# Patient Record
Sex: FEMALE | Race: WHITE | NOT HISPANIC OR LATINO | URBAN - METROPOLITAN AREA
[De-identification: names, ages, dates, MRNs, and addresses within clinical notes are randomized per-mention and may not be internally consistent; named-entity substitution may affect disease eponyms.]

---

## 2017-08-28 ENCOUNTER — APPOINTMENT (OUTPATIENT)
Dept: URBAN - METROPOLITAN AREA CLINIC 200 | Age: 52
Setting detail: DERMATOLOGY
End: 2017-08-29

## 2017-08-28 DIAGNOSIS — L57.8 OTHER SKIN CHANGES DUE TO CHRONIC EXPOSURE TO NONIONIZING RADIATION: ICD-10-CM

## 2017-08-28 DIAGNOSIS — D22 MELANOCYTIC NEVI: ICD-10-CM

## 2017-08-28 PROBLEM — D22.39 MELANOCYTIC NEVI OF OTHER PARTS OF FACE: Status: ACTIVE | Noted: 2017-08-28

## 2017-08-28 PROCEDURE — OTHER COUNSELING: OTHER

## 2017-08-28 PROCEDURE — 99213 OFFICE O/P EST LOW 20 MIN: CPT

## 2017-08-28 PROCEDURE — OTHER DEFER: OTHER

## 2017-08-28 ASSESSMENT — LOCATION DETAILED DESCRIPTION DERM
LOCATION DETAILED: RIGHT SUPERIOR UPPER BACK
LOCATION DETAILED: LEFT SUPERIOR LATERAL BUCCAL CHEEK

## 2017-08-28 ASSESSMENT — LOCATION SIMPLE DESCRIPTION DERM
LOCATION SIMPLE: RIGHT UPPER BACK
LOCATION SIMPLE: LEFT CHEEK

## 2017-08-28 ASSESSMENT — LOCATION ZONE DERM
LOCATION ZONE: TRUNK
LOCATION ZONE: FACE

## 2017-09-07 ENCOUNTER — APPOINTMENT (OUTPATIENT)
Dept: URBAN - METROPOLITAN AREA CLINIC 200 | Age: 52
Setting detail: DERMATOLOGY
End: 2017-09-08

## 2017-09-07 DIAGNOSIS — D485 NEOPLASM OF UNCERTAIN BEHAVIOR OF SKIN: ICD-10-CM

## 2017-09-07 PROBLEM — D48.5 NEOPLASM OF UNCERTAIN BEHAVIOR OF SKIN: Status: ACTIVE | Noted: 2017-09-07

## 2017-09-07 PROCEDURE — OTHER BIOPSY BY SHAVE METHOD: OTHER

## 2017-09-07 PROCEDURE — 11100: CPT

## 2017-09-07 ASSESSMENT — LOCATION SIMPLE DESCRIPTION DERM: LOCATION SIMPLE: LEFT CHEEK

## 2017-09-07 ASSESSMENT — LOCATION ZONE DERM: LOCATION ZONE: FACE

## 2017-09-07 ASSESSMENT — LOCATION DETAILED DESCRIPTION DERM: LOCATION DETAILED: LEFT SUPERIOR LATERAL BUCCAL CHEEK

## 2017-09-07 NOTE — PROCEDURE: BIOPSY BY SHAVE METHOD
Wound Care: Vaseline
Biopsy Method: Personna blade
Notification Instructions: Patient will be notified of biopsy results. However, patient instructed to call the office if not contacted within 2 weeks.
Bill 28057 For Specimen Handling/Conveyance To Laboratory?: no
X Size Of Lesion In Cm: 0
Type Of Destruction Used: Curettage
Dressing: bandage
Biopsy Type: H and E
Detail Level: Detailed
Size Of Lesion In Cm: 0.3
Billing Type: Third-Party Bill
Post-Care Instructions: I reviewed with the patient in detail post-care instructions. Patient is to keep the biopsy site dry overnight, and then apply bacitracin twice daily until healed. Patient may apply hydrogen peroxide soaks to remove any crusting.
Hemostasis: Drysol
consent was obtained and risks were reviewed including but not limited to scarring, infection, bleeding, scabbing, incomplete removal, nerve damage and allergy to anesthesia.
Anesthesia Volume In Cc (Will Not Render If 0): 0.1
Anesthesia Type: 1% lidocaine with epinephrine

## 2018-08-28 ENCOUNTER — APPOINTMENT (OUTPATIENT)
Dept: URBAN - METROPOLITAN AREA CLINIC 200 | Age: 53
Setting detail: DERMATOLOGY
End: 2018-09-10

## 2018-08-28 DIAGNOSIS — Z85.828 PERSONAL HISTORY OF OTHER MALIGNANT NEOPLASM OF SKIN: ICD-10-CM

## 2018-08-28 PROCEDURE — OTHER COUNSELING: OTHER

## 2018-08-28 PROCEDURE — 99213 OFFICE O/P EST LOW 20 MIN: CPT

## 2018-08-28 ASSESSMENT — LOCATION ZONE DERM: LOCATION ZONE: TRUNK

## 2018-08-28 ASSESSMENT — LOCATION DETAILED DESCRIPTION DERM: LOCATION DETAILED: PERIUMBILICAL SKIN

## 2018-08-28 ASSESSMENT — LOCATION SIMPLE DESCRIPTION DERM: LOCATION SIMPLE: ABDOMEN

## 2019-02-27 ENCOUNTER — APPOINTMENT (OUTPATIENT)
Dept: URBAN - METROPOLITAN AREA CLINIC 200 | Age: 54
Setting detail: DERMATOLOGY
End: 2019-02-28

## 2019-02-27 DIAGNOSIS — L57.8 OTHER SKIN CHANGES DUE TO CHRONIC EXPOSURE TO NONIONIZING RADIATION: ICD-10-CM

## 2019-02-27 PROCEDURE — OTHER MIPS QUALITY: OTHER

## 2019-02-27 PROCEDURE — OTHER COUNSELING: OTHER

## 2019-02-27 PROCEDURE — 99213 OFFICE O/P EST LOW 20 MIN: CPT

## 2019-02-27 ASSESSMENT — LOCATION DETAILED DESCRIPTION DERM: LOCATION DETAILED: LEFT MEDIAL SUPERIOR CHEST

## 2019-02-27 ASSESSMENT — LOCATION SIMPLE DESCRIPTION DERM: LOCATION SIMPLE: CHEST

## 2019-02-27 ASSESSMENT — LOCATION ZONE DERM: LOCATION ZONE: TRUNK

## 2019-09-06 ENCOUNTER — APPOINTMENT (OUTPATIENT)
Dept: URBAN - METROPOLITAN AREA CLINIC 200 | Age: 54
Setting detail: DERMATOLOGY
End: 2019-09-11

## 2019-09-06 DIAGNOSIS — L57.8 OTHER SKIN CHANGES DUE TO CHRONIC EXPOSURE TO NONIONIZING RADIATION: ICD-10-CM

## 2019-09-06 PROBLEM — D23.5 OTHER BENIGN NEOPLASM OF SKIN OF TRUNK: Status: ACTIVE | Noted: 2019-09-06

## 2019-09-06 PROCEDURE — OTHER PRESCRIPTION SAMPLES PROVIDED: OTHER

## 2019-09-06 PROCEDURE — OTHER COUNSELING: OTHER

## 2019-09-06 PROCEDURE — OTHER MIPS QUALITY: OTHER

## 2019-09-06 PROCEDURE — OTHER RECOMMENDATIONS: OTHER

## 2019-09-06 PROCEDURE — 99213 OFFICE O/P EST LOW 20 MIN: CPT

## 2019-09-06 ASSESSMENT — LOCATION ZONE DERM: LOCATION ZONE: TRUNK

## 2019-09-06 ASSESSMENT — LOCATION SIMPLE DESCRIPTION DERM: LOCATION SIMPLE: CHEST

## 2019-09-06 ASSESSMENT — LOCATION DETAILED DESCRIPTION DERM: LOCATION DETAILED: LEFT MEDIAL SUPERIOR CHEST

## 2019-09-06 NOTE — PROCEDURE: MIPS QUALITY
Additional Notes: Shingles SHINGRIX vaccine not received due to it being on back order.
Detail Level: Detailed
Quality 474: Zoster Vaccination Status: Shingrix vaccine was not administered for reasons documented by clinician (e.g. patient administered vaccine other than Shingrix, patient allergy or other medical reasons, patient declined or other patient reasons, vaccine not available or other system reasons)

## 2020-03-06 ENCOUNTER — APPOINTMENT (OUTPATIENT)
Dept: URBAN - METROPOLITAN AREA CLINIC 200 | Age: 55
Setting detail: DERMATOLOGY
End: 2020-03-11

## 2020-03-06 DIAGNOSIS — L20.84 INTRINSIC (ALLERGIC) ECZEMA: ICD-10-CM

## 2020-03-06 DIAGNOSIS — L82.1 OTHER SEBORRHEIC KERATOSIS: ICD-10-CM

## 2020-03-06 DIAGNOSIS — L57.8 OTHER SKIN CHANGES DUE TO CHRONIC EXPOSURE TO NONIONIZING RADIATION: ICD-10-CM

## 2020-03-06 PROCEDURE — OTHER REASSURANCE: OTHER

## 2020-03-06 PROCEDURE — 99213 OFFICE O/P EST LOW 20 MIN: CPT

## 2020-03-06 PROCEDURE — OTHER COUNSELING: OTHER

## 2020-03-06 PROCEDURE — OTHER MIPS QUALITY: OTHER

## 2020-03-06 PROCEDURE — OTHER PRESCRIPTION: OTHER

## 2020-03-06 PROCEDURE — OTHER PRESCRIPTION MEDICATION MANAGEMENT: OTHER

## 2020-03-06 RX ORDER — TRIAMCINOLONE ACETONIDE 1 MG/G
CREAM TOPICAL
Qty: 1 | Refills: 5 | Status: ERX | COMMUNITY
Start: 2020-03-06

## 2020-03-06 ASSESSMENT — LOCATION SIMPLE DESCRIPTION DERM
LOCATION SIMPLE: CHEST
LOCATION SIMPLE: LEFT HAND
LOCATION SIMPLE: ABDOMEN
LOCATION SIMPLE: LEFT CHEEK
LOCATION SIMPLE: RIGHT HAND

## 2020-03-06 ASSESSMENT — LOCATION ZONE DERM
LOCATION ZONE: TRUNK
LOCATION ZONE: HAND
LOCATION ZONE: FACE

## 2020-03-06 ASSESSMENT — LOCATION DETAILED DESCRIPTION DERM
LOCATION DETAILED: RIGHT DORSAL MIDDLE METACARPOPHALANGEAL JOINT
LOCATION DETAILED: LEFT LATERAL ABDOMEN
LOCATION DETAILED: LEFT DORSAL MIDDLE METACARPOPHALANGEAL JOINT
LOCATION DETAILED: LEFT MEDIAL SUPERIOR CHEST
LOCATION DETAILED: LEFT INFERIOR LATERAL BUCCAL CHEEK

## 2020-03-06 NOTE — PROCEDURE: PRESCRIPTION MEDICATION MANAGEMENT
Render In Strict Bullet Format?: No
Initiate Treatment: triamcinolone acetonide 0.1 % topical cream
Detail Level: Detailed
Samples Given: Eucerin eczema relief , lipikar

## 2020-09-11 ENCOUNTER — APPOINTMENT (OUTPATIENT)
Dept: URBAN - METROPOLITAN AREA CLINIC 200 | Age: 55
Setting detail: DERMATOLOGY
End: 2020-09-18

## 2020-09-11 DIAGNOSIS — L23.7 ALLERGIC CONTACT DERMATITIS DUE TO PLANTS, EXCEPT FOOD: ICD-10-CM

## 2020-09-11 DIAGNOSIS — Z85.828 PERSONAL HISTORY OF OTHER MALIGNANT NEOPLASM OF SKIN: ICD-10-CM

## 2020-09-11 DIAGNOSIS — L70.0 ACNE VULGARIS: ICD-10-CM

## 2020-09-11 DIAGNOSIS — L71.8 OTHER ROSACEA: ICD-10-CM

## 2020-09-11 DIAGNOSIS — L57.8 OTHER SKIN CHANGES DUE TO CHRONIC EXPOSURE TO NONIONIZING RADIATION: ICD-10-CM

## 2020-09-11 PROBLEM — D23.5 OTHER BENIGN NEOPLASM OF SKIN OF TRUNK: Status: ACTIVE | Noted: 2020-09-11

## 2020-09-11 PROCEDURE — OTHER PRESCRIPTION: OTHER

## 2020-09-11 PROCEDURE — OTHER PRESCRIPTION MEDICATION MANAGEMENT: OTHER

## 2020-09-11 PROCEDURE — 99213 OFFICE O/P EST LOW 20 MIN: CPT

## 2020-09-11 PROCEDURE — OTHER COUNSELING: OTHER

## 2020-09-11 RX ORDER — METRONIDAZOLE 7.5 MG/G
GEL TOPICAL
Qty: 1 | Refills: 6 | Status: ERX | COMMUNITY
Start: 2020-09-11

## 2020-09-11 ASSESSMENT — LOCATION DETAILED DESCRIPTION DERM
LOCATION DETAILED: LEFT MEDIAL SUPERIOR CHEST
LOCATION DETAILED: PERIUMBILICAL SKIN
LOCATION DETAILED: LEFT LATERAL ABDOMEN
LOCATION DETAILED: RIGHT ANTERIOR PROXIMAL THIGH

## 2020-09-11 ASSESSMENT — LOCATION ZONE DERM
LOCATION ZONE: LEG
LOCATION ZONE: TRUNK

## 2020-09-11 ASSESSMENT — LOCATION SIMPLE DESCRIPTION DERM
LOCATION SIMPLE: RIGHT THIGH
LOCATION SIMPLE: ABDOMEN
LOCATION SIMPLE: CHEST

## 2021-03-24 ENCOUNTER — APPOINTMENT (OUTPATIENT)
Dept: URBAN - METROPOLITAN AREA CLINIC 200 | Age: 56
Setting detail: DERMATOLOGY
End: 2021-03-30

## 2021-03-24 DIAGNOSIS — B07.8 OTHER VIRAL WARTS: ICD-10-CM

## 2021-03-24 DIAGNOSIS — Z85.828 PERSONAL HISTORY OF OTHER MALIGNANT NEOPLASM OF SKIN: ICD-10-CM

## 2021-03-24 DIAGNOSIS — L71.8 OTHER ROSACEA: ICD-10-CM

## 2021-03-24 DIAGNOSIS — L57.8 OTHER SKIN CHANGES DUE TO CHRONIC EXPOSURE TO NONIONIZING RADIATION: ICD-10-CM

## 2021-03-24 PROBLEM — D48.5 NEOPLASM OF UNCERTAIN BEHAVIOR OF SKIN: Status: ACTIVE | Noted: 2021-03-24

## 2021-03-24 PROCEDURE — OTHER BENIGN DESTRUCTION: OTHER

## 2021-03-24 PROCEDURE — 99213 OFFICE O/P EST LOW 20 MIN: CPT | Mod: 25

## 2021-03-24 PROCEDURE — OTHER COUNSELING: OTHER

## 2021-03-24 PROCEDURE — OTHER MIPS QUALITY: OTHER

## 2021-03-24 PROCEDURE — 17110 DESTRUCT B9 LESION 1-14: CPT

## 2021-03-24 ASSESSMENT — LOCATION SIMPLE DESCRIPTION DERM
LOCATION SIMPLE: LEFT CHEEK
LOCATION SIMPLE: RIGHT CHEEK
LOCATION SIMPLE: ABDOMEN
LOCATION SIMPLE: CHEST

## 2021-03-24 ASSESSMENT — LOCATION DETAILED DESCRIPTION DERM
LOCATION DETAILED: RIGHT SUPERIOR CENTRAL MALAR CHEEK
LOCATION DETAILED: LEFT MEDIAL SUPERIOR CHEST
LOCATION DETAILED: LEFT INFERIOR MEDIAL MALAR CHEEK
LOCATION DETAILED: PERIUMBILICAL SKIN

## 2021-03-24 ASSESSMENT — SEVERITY ASSESSMENT OVERALL AMONG ALL PATIENTS
IN YOUR EXPERIENCE, AMONG ALL PATIENTS YOU HAVE SEEN WITH THIS CONDITION, HOW SEVERE IS THIS PATIENT'S CONDITION?: MILD TO MODERATE

## 2021-03-24 ASSESSMENT — TOTAL NUMBER OF VERRUCA VULGARIS: # OF LESIONS?: 1

## 2021-03-24 ASSESSMENT — LOCATION ZONE DERM
LOCATION ZONE: TRUNK
LOCATION ZONE: FACE

## 2021-03-24 NOTE — PROCEDURE: BENIGN DESTRUCTION
Render Note In Bullet Format When Appropriate: No
Medical Necessity Information: It is in your best interest to select a reason for this procedure from the list below. All of these items fulfill various CMS LCD requirements except the new and changing color options.
Anesthesia Type: 1% Xylocaine with [id number] epinephrine and sodium bicarbonate
Post-Care Instructions: I reviewed with the patient in detail post-care instructions. Patient is to wear sunprotection, and avoid picking at any of the treated lesions. Pt may apply Vaseline to crusted or scabbing areas.
Treatment Number (Will Not Render If 0): 1
Detail Level: Detailed
Medical Necessity Clause: This procedure was medically necessary because the lesions that were treated were: irritated
Anesthesia Volume In Cc: 0.5
Consent: The patient's consent was obtained including but not limited to risks of crusting, scabbing, blistering, scarring, darker or lighter pigmentary change, recurrence, incomplete removal and infection.

## 2021-10-14 ENCOUNTER — APPOINTMENT (OUTPATIENT)
Dept: URBAN - METROPOLITAN AREA CLINIC 200 | Age: 56
Setting detail: DERMATOLOGY
End: 2021-10-15

## 2021-10-14 DIAGNOSIS — L82.1 OTHER SEBORRHEIC KERATOSIS: ICD-10-CM

## 2021-10-14 DIAGNOSIS — L57.8 OTHER SKIN CHANGES DUE TO CHRONIC EXPOSURE TO NONIONIZING RADIATION: ICD-10-CM

## 2021-10-14 DIAGNOSIS — Z11.52 ENCOUNTER FOR SCREENING FOR COVID-19: ICD-10-CM

## 2021-10-14 PROBLEM — D23.5 OTHER BENIGN NEOPLASM OF SKIN OF TRUNK: Status: ACTIVE | Noted: 2021-10-14

## 2021-10-14 PROCEDURE — OTHER REASSURANCE: OTHER

## 2021-10-14 PROCEDURE — OTHER MIPS QUALITY: OTHER

## 2021-10-14 PROCEDURE — OTHER SCREENING FOR COVID-19: OTHER

## 2021-10-14 PROCEDURE — 99213 OFFICE O/P EST LOW 20 MIN: CPT

## 2021-10-14 PROCEDURE — OTHER SUNSCREEN RECOMMENDATIONS: OTHER

## 2021-10-14 PROCEDURE — OTHER COUNSELING: OTHER

## 2021-10-14 ASSESSMENT — LOCATION SIMPLE DESCRIPTION DERM
LOCATION SIMPLE: ABDOMEN
LOCATION SIMPLE: LEFT SUBMANDIBULAR AREA

## 2021-10-14 ASSESSMENT — LOCATION DETAILED DESCRIPTION DERM
LOCATION DETAILED: PERIUMBILICAL SKIN
LOCATION DETAILED: LEFT SUBMANDIBULAR AREA

## 2021-10-14 ASSESSMENT — LOCATION ZONE DERM
LOCATION ZONE: TRUNK
LOCATION ZONE: FACE

## 2021-10-14 ASSESSMENT — PAIN INTENSITY VAS: HOW INTENSE IS YOUR PAIN 0 BEING NO PAIN, 10 BEING THE MOST SEVERE PAIN POSSIBLE?: NO PAIN

## 2022-03-10 ENCOUNTER — OFFICE VISIT (OUTPATIENT)
Dept: ENDOCRINOLOGY | Facility: CLINIC | Age: 57
End: 2022-03-10
Payer: COMMERCIAL

## 2022-03-10 VITALS
WEIGHT: 133 LBS | HEIGHT: 62 IN | OXYGEN SATURATION: 99 % | SYSTOLIC BLOOD PRESSURE: 132 MMHG | DIASTOLIC BLOOD PRESSURE: 88 MMHG | BODY MASS INDEX: 24.48 KG/M2 | HEART RATE: 104 BPM

## 2022-03-10 DIAGNOSIS — M81.0 OSTEOPOROSIS WITHOUT CURRENT PATHOLOGICAL FRACTURE, UNSPECIFIED OSTEOPOROSIS TYPE: Primary | ICD-10-CM

## 2022-03-10 PROCEDURE — 3008F BODY MASS INDEX DOCD: CPT | Performed by: INTERNAL MEDICINE

## 2022-03-10 PROCEDURE — 99205 OFFICE O/P NEW HI 60 MIN: CPT | Performed by: INTERNAL MEDICINE

## 2022-03-10 RX ORDER — PNV NO.95/FERROUS FUM/FOLIC AC 28MG-0.8MG
1 TABLET ORAL DAILY
COMMUNITY

## 2022-03-10 RX ORDER — ROSUVASTATIN CALCIUM 10 MG/1
TABLET, FILM COATED ORAL
COMMUNITY
Start: 2022-01-04

## 2022-03-10 RX ORDER — CETIRIZINE HYDROCHLORIDE 10 MG/1
10 TABLET ORAL DAILY
COMMUNITY

## 2022-03-10 RX ORDER — OLMESARTAN MEDOXOMIL 20 MG/1
20 TABLET ORAL DAILY
COMMUNITY

## 2022-03-10 ASSESSMENT — PAIN SCALES - GENERAL: PAINLEVEL: 0-NO PAIN

## 2022-03-10 NOTE — LETTER
March 10, 2022     Devin Borja MD  [address]  [facility]     Patient: Jacqueline Reid  YOB: 1965  Date of Visit: 3/10/2022      Dear Dr. Borja:    Thank you for referring Jacqueline Reid to me for evaluation. Below are my notes for this consultation.    If you have questions, please do not hesitate to call me. I look forward to following your patient along with you.         Sincerely,        Lorenza Yi MD        CC: No Recipients  Lorenza Yi MD  3/10/2022 10:32 PM  Signed         Endocrinology Consultation Report     Patient Name: Jacqueline Reid  MR#: [id number]  : 1965  Consultant: Lorenza Yi MD      Jacqueline Reid is a 56 y.o. female who presents for evaluation for osteoporosis    HPI:  She had left wrist fracture on 10/30/21, she fell sideways (was holding on to a tree, but had to let go) onto her left side. She had surgery on 2021.   Her ortho recommended a bone density. The radius is healed. 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  It is now healing well. She is doing PT twice a week.   Left wrist fracture as a child (age 7), left collar bone fracture (around age 5)  Left toe fracture.       OSTEOPOROSIS HISTORY:  Fractures: see above  Kidney stones: none  Chronic steroids: none  Seizure medications: none  Acid reflux: none  History of radiation: none  Dental problems: none  Cigarette smoking: never  Alcohol: once a week or less, 1 serving  Caffeine: one cup of coffee per day  Pregnancies: 2 (  and )  LMP age 51  No HRT  No parental history of hip fracture      Supplements:  - calcium plus D 600 mg, 2 pills daily, for many years  - vitamin D3 2000 IU daily, started 2022    Diet  Milk with cereal 1 to 2 times per week  Greek yogurt almost daily  Cheese most days    Exercise  She was going to the gym before COVID  She now walks for exercise  Swimming a couple of days a week      She worked as an academic  advisor at [employer]  Stopped working in   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      Medical History:  Past Medical History:   Diagnosis Date   • Hyperlipidemia    • Hypertension    • Osteoporosis        Surgical History:   Past Surgical History:   Procedure Laterality Date   •  SECTION     • WRIST SURGERY  2021       Family History:  Family History   Problem Relation Age of Onset   • Diabetes Biological Mother    • Lung cancer Biological Mother    • Hypertension Biological Mother    • Hyperlipidemia Biological Mother    • Kidney disease Biological Mother    • Prostate cancer Biological Father    • Hypertension Biological Father    • Hyperlipidemia Biological Father    • Stroke Biological Father    • Diabetes Biological Sister    • Hypertension Biological Sister    • Hyperlipidemia Biological Sister    • Hypertension Biological Brother    • Hyperlipidemia Biological Brother    • Heart disease Biological Brother    • Heart attack Biological Brother         Social history:  Social History     Socioeconomic History   • Marital status:      Spouse name: Not on file   • Number of children: Not on file   • Years of education: Not on file   • Highest education level: Not on file   Occupational History   • Not on file   Tobacco Use   • Smoking status: Never Smoker   • Smokeless tobacco: Never Used   Substance and Sexual Activity   • Alcohol use: Yes   • Drug use: Not on file   • Sexual activity: Defer   Other Topics Concern   • Not on file   Social History Narrative   • Not on file     Social Determinants of Health     Financial Resource Strain: Not on file   Food Insecurity: Not on file   Transportation Needs: Not on file   Physical Activity: Not on file   Stress: Not on file   Social Connections: Not on file   Intimate Partner Violence: Not on file   Housing Stability: Not on file       Medication(active prior to today):  Current Outpatient Medications   Medication Sig Dispense Refill   •  "calcium carbonate-vitamin D3 500 mg-10 mcg (400 unit) tablet Take 1 tablet by mouth daily.     • cetirizine (ZyrTEC) 10 mg tablet Take 10 mg by mouth daily.     • CRESTOR 10 mg tablet      • olmesartan (BENICAR) 20 mg tablet Take 20 mg by mouth daily.       No current facility-administered medications for this visit.       Allergies:  Banana, Kiwi, Quinolones, and Sulfa (sulfonamide antibiotics)    Review of Systems  All other systems reviewed and negative except as noted in HPI    Vitals:    03/10/22 1318   BP: 132/88   BP Location: Right upper arm   Patient Position: Sitting   Pulse: (!) 104   SpO2: 99%   Weight: 60.3 kg (133 lb)   Height: 1.575 m (5' 2\")     Body mass index is 24.33 kg/m².    Physical Exam    No results found for: HGBA1C, CREATININE, K  No results found for: TSH, T3FREE, FREET4, TRIG, LDL, CHOL, LDLDIRECT, ALT        LABS:  12/28/2021  Free T4 1.3  Cholesterol 174  HDL 64  TG 80  LDL 93  Albumin 4.5  Globulin 2.7  And alk phos 88  ALT 12  TSH 3.29      4/1/2021  TSH 5.13  BG 90  Creatinine 0.97  Sodium 141  Calcium 9.8  Albumin 4.8  Alk phos 74  ALT 12  Free T4 1.1  Hemoglobin 14.4      [facility] 12/29/2021 ([facility])  Bone densitometry of the PA lumbar spine and left hip was performed using a Hologic bone densitometer. The patient is well positioned and the regions of interest are properly placed.     PA spine L1-L4       BMD:  0.673 g/cm2     T-score:  -3.4,   osteoporosis       Total hip                   BMD:  0.626 g/cm2     T-score:  -2.6,   osteoporosis         Femoral neck          BMD:  0.529 g/cm2     T-score:  -2.9,   osteoporosis       Assessment/Plan   Osteoporosis  - s/p left wrist fracture with difficulty healing  - very low T-score  - consistent with severe osteoporosis  - start with work up for secondary causes of osteoporosis  - she is a candidate for anabolic bone therapy  - I discussed treatment with Tymlos with patient and her . No contraindications. I " discussed the side effects.   - she understands that we use Tymlos for 18 months and it has be followed by treatment with an antiresorptive agent  - I discussed her calcium and vitamin D supplement  - long discussion about exercise.       Problem List Items Addressed This Visit     None      Visit Diagnoses     Osteoporosis without current pathological fracture, unspecified osteoporosis type    -  Primary    Relevant Orders    Comprehensive metabolic panel    CBC    Vitamin D 25 hydroxy    PTH, intact    Phosphorus    Celiac reflex panel    Calcium, urine, 24 hour    Creatinine, urine, 24 hour        Patient Instructions   - take calcium plus D 600 mg, one pill daily with food    - vitamin D3, 2000 Iu daily    - blood test and 24 hr urine collection    - send me a portal message when you submit the urine  I will send a prescription for Tymlos after that  Plan for daily Tymlos injection for 18 months  After that you have to take another medication like fosamax to keep the bone gains you made       - regular exercise safely  [email or web address] for safe exercises                  I spent 60 minutes on this date of service performing the following activities: obtaining history, performing examination, entering orders, documenting, preparing for visit, obtaining / reviewing records, providing counseling and education, independently reviewing study/studies and coordinating care.      Lorenza Yi MD

## 2022-03-10 NOTE — PROGRESS NOTES
Endocrinology Consultation Report     Patient Name: Jacqueline Reid  MR#: [id number]  : 1965  Consultant: Lorenza Yi MD      Jacqueline Reid is a 56 y.o. female who presents for evaluation for osteoporosis    HPI:  She had left wrist fracture on 10/30/21, she fell sideways (was holding on to a tree, but had to let go) onto her left side. She had surgery on 2021.   Her ortho recommended a bone density. The radius is healed. 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  It is now healing well. She is doing PT twice a week.   Left wrist fracture as a child (age 7), left collar bone fracture (around age 5)  Left toe fracture.       OSTEOPOROSIS HISTORY:  Fractures: see above  Kidney stones: none  Chronic steroids: none  Seizure medications: none  Acid reflux: none  History of radiation: none  Dental problems: none  Cigarette smoking: never  Alcohol: once a week or less, 1 serving  Caffeine: one cup of coffee per day  Pregnancies: 2 (  and )  LMP age 51  No HRT  No parental history of hip fracture      Supplements:  - calcium plus D 600 mg, 2 pills daily, for many years  - vitamin D3 2000 IU daily, started 2022    Diet  Milk with cereal 1 to 2 times per week  Greek yogurt almost daily  Cheese most days    Exercise  She was going to the gym before COVID  She now walks for exercise  Swimming a couple of days a week      She worked as an  at [employer]  Stopped working in   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      Medical History:  Past Medical History:   Diagnosis Date   • Hyperlipidemia    • Hypertension    • Osteoporosis        Surgical History:   Past Surgical History:   Procedure Laterality Date   •  SECTION     • WRIST SURGERY  2021       Family History:  Family History   Problem Relation Age of Onset   • Diabetes Biological Mother    • Lung cancer Biological Mother    • Hypertension Biological Mother    • Hyperlipidemia  Biological Mother    • Kidney disease Biological Mother    • Prostate cancer Biological Father    • Hypertension Biological Father    • Hyperlipidemia Biological Father    • Stroke Biological Father    • Diabetes Biological Sister    • Hypertension Biological Sister    • Hyperlipidemia Biological Sister    • Hypertension Biological Brother    • Hyperlipidemia Biological Brother    • Heart disease Biological Brother    • Heart attack Biological Brother         Social history:  Social History     Socioeconomic History   • Marital status:      Spouse name: Not on file   • Number of children: Not on file   • Years of education: Not on file   • Highest education level: Not on file   Occupational History   • Not on file   Tobacco Use   • Smoking status: Never Smoker   • Smokeless tobacco: Never Used   Substance and Sexual Activity   • Alcohol use: Yes   • Drug use: Not on file   • Sexual activity: Defer   Other Topics Concern   • Not on file   Social History Narrative   • Not on file     Social Determinants of Health     Financial Resource Strain: Not on file   Food Insecurity: Not on file   Transportation Needs: Not on file   Physical Activity: Not on file   Stress: Not on file   Social Connections: Not on file   Intimate Partner Violence: Not on file   Housing Stability: Not on file       Medication(active prior to today):  Current Outpatient Medications   Medication Sig Dispense Refill   • calcium carbonate-vitamin D3 500 mg-10 mcg (400 unit) tablet Take 1 tablet by mouth daily.     • cetirizine (ZyrTEC) 10 mg tablet Take 10 mg by mouth daily.     • CRESTOR 10 mg tablet      • olmesartan (BENICAR) 20 mg tablet Take 20 mg by mouth daily.       No current facility-administered medications for this visit.       Allergies:  Banana, Kiwi, Quinolones, and Sulfa (sulfonamide antibiotics)    Review of Systems  All other systems reviewed and negative except as noted in HPI    Vitals:    03/10/22 1318   BP: 132/88   BP  "Location: Right upper arm   Patient Position: Sitting   Pulse: (!) 104   SpO2: 99%   Weight: 60.3 kg (133 lb)   Height: 1.575 m (5' 2\")     Body mass index is 24.33 kg/m².    Physical Exam    No results found for: HGBA1C, CREATININE, K  No results found for: TSH, T3FREE, FREET4, TRIG, LDL, CHOL, LDLDIRECT, ALT        LABS:  12/28/2021  Free T4 1.3  Cholesterol 174  HDL 64  TG 80  LDL 93  Albumin 4.5  Globulin 2.7  And alk phos 88  ALT 12  TSH 3.29      4/1/2021  TSH 5.13  BG 90  Creatinine 0.97  Sodium 141  Calcium 9.8  Albumin 4.8  Alk phos 74  ALT 12  Free T4 1.1  Hemoglobin 14.4      DEXA 12/29/2021 ([facility])  Bone densitometry of the PA lumbar spine and left hip was performed using a Hologic bone densitometer. The patient is well positioned and the regions of interest are properly placed.     PA spine L1-L4       BMD:  0.673 g/cm2     T-score:  -3.4,   osteoporosis       Total hip                   BMD:  0.626 g/cm2     T-score:  -2.6,   osteoporosis         Femoral neck          BMD:  0.529 g/cm2     T-score:  -2.9,   osteoporosis       Assessment/Plan   Osteoporosis  - s/p left wrist fracture with difficulty healing  - very low T-score  - consistent with severe osteoporosis  - start with work up for secondary causes of osteoporosis  - she is a candidate for anabolic bone therapy  - I discussed treatment with Tymlos with patient and her . No contraindications. I discussed the side effects.   - she understands that we use Tymlos for 18 months and it has be followed by treatment with an antiresorptive agent  - I discussed her calcium and vitamin D supplement  - long discussion about exercise.       Problem List Items Addressed This Visit     None      Visit Diagnoses     Osteoporosis without current pathological fracture, unspecified osteoporosis type    -  Primary    Relevant Orders    Comprehensive metabolic panel    CBC    Vitamin D 25 hydroxy    PTH, intact    Phosphorus    " Celiac reflex panel    Calcium, urine, 24 hour    Creatinine, urine, 24 hour        Patient Instructions   - take calcium plus D 600 mg, one pill daily with food    - vitamin D3, 2000 Iu daily    - blood test and 24 hr urine collection    - send me a portal message when you submit the urine  I will send a prescription for Tymlos after that  Plan for daily Tymlos injection for 18 months  After that you have to take another medication like fosamax to keep the bone gains you made       - regular exercise safely  [email or web address] for safe exercises                  I spent 60 minutes on this date of service performing the following activities: obtaining history, performing examination, entering orders, documenting, preparing for visit, obtaining / reviewing records, providing counseling and education, independently reviewing study/studies and coordinating care.      Lorenza Yi MD

## 2022-03-10 NOTE — PATIENT INSTRUCTIONS
- take calcium plus D 600 mg, one pill daily with food    - vitamin D3, 2000 Iu daily    - blood test and 24 hr urine collection    - send me a portal message when you submit the urine  I will send a prescription for Tymlos after that  Plan for daily Tymlos injection for 18 months  After that you have to take another medication like fosamax to keep the bone gains you made       - regular exercise safely  [email or web address] for safe exercises

## 2022-03-29 LAB
25(OH)D3 SERPL-MCNC: 46 NG/ML (ref 30–100)
ALBUMIN SERPL-MCNC: 4.5 G/DL (ref 3.6–5.1)
ALBUMIN/GLOB SERPL: 1.7 (CALC) (ref 1–2.5)
ALP SERPL-CCNC: 79 U/L (ref 37–153)
ALT SERPL-CCNC: 13 U/L (ref 6–29)
AST SERPL-CCNC: 16 U/L (ref 10–35)
BILIRUB SERPL-MCNC: 0.5 MG/DL (ref 0.2–1.2)
BUN SERPL-MCNC: 21 MG/DL (ref 7–25)
BUN/CREAT SERPL: NORMAL (CALC) (ref 6–22)
CALCIUM SERPL-MCNC: 9.5 MG/DL (ref 8.6–10.4)
CHLORIDE SERPL-SCNC: 102 MMOL/L (ref 98–110)
CO2 SERPL-SCNC: 32 MMOL/L (ref 20–32)
CREAT SERPL-MCNC: 0.9 MG/DL (ref 0.5–1.05)
ERYTHROCYTE [DISTWIDTH] IN BLOOD BY AUTOMATED COUNT: 12.6 % (ref 11–15)
GLOBULIN SER CALC-MCNC: 2.7 G/DL (CALC) (ref 1.9–3.7)
GLUCOSE SERPL-MCNC: 89 MG/DL (ref 65–99)
HCT VFR BLD AUTO: 40.2 % (ref 35–45)
HGB BLD-MCNC: 13.1 G/DL (ref 11.7–15.5)
IGA SERPL-MCNC: 259 MG/DL (ref 47–310)
LABORATORY COMMENT REPORT: NORMAL
MCH RBC QN AUTO: 30.8 PG (ref 27–33)
MCHC RBC AUTO-ENTMCNC: 32.6 G/DL (ref 32–36)
MCV RBC AUTO: 94.6 FL (ref 80–100)
PHOSPHATE SERPL-MCNC: 3.8 MG/DL (ref 2.5–4.5)
PLATELET # BLD AUTO: 272 THOUSAND/UL (ref 140–400)
PMV BLD REES-ECKER: 10.4 FL (ref 7.5–12.5)
POTASSIUM SERPL-SCNC: 4.2 MMOL/L (ref 3.5–5.3)
PROT SERPL-MCNC: 7.2 G/DL (ref 6.1–8.1)
PTH-INTACT SERPL-MCNC: 53 PG/ML (ref 16–77)
QUEST EGFR AFRICAN AMERICAN: 83 ML/MIN/1.73M2
QUEST EGFR NON-AFR. AMERICAN: 71 ML/MIN/1.73M2
RBC # BLD AUTO: 4.25 MILLION/UL (ref 3.8–5.1)
SODIUM SERPL-SCNC: 140 MMOL/L (ref 135–146)
TTG IGA SER-ACNC: <1 U/ML
WBC # BLD AUTO: 6.2 THOUSAND/UL (ref 3.8–10.8)

## 2022-03-30 LAB
CALCIUM 24H UR-MCNC: 12.6 MG/DL
CALCIUM 24H UR-MRATE: 113 MG/24 H (ref 35–250)
CREAT 24H UR-MRATE: 1.05 G/24 H (ref 0.5–2.15)
SPECIMEN VOL 24H UR: 900 ML

## 2022-03-31 ENCOUNTER — TELEPHONE (OUTPATIENT)
Dept: ENDOCRINOLOGY | Facility: CLINIC | Age: 57
End: 2022-03-31
Payer: COMMERCIAL

## 2022-03-31 ENCOUNTER — TELEPHONE (OUTPATIENT)
Dept: ENDOCRINOLOGY | Facility: CLINIC | Age: 57
End: 2022-03-31

## 2022-04-01 NOTE — TELEPHONE ENCOUNTER
I sent a prescription for Tymlos to [facility]. They should be sending paperwork to help with the approval process. Can you please follow up .

## 2022-04-01 NOTE — TELEPHONE ENCOUNTER
"Susie called [facility] - it has been rejected to being filled at [facility]. [facility] is working on getting an \"override\" for this and it is being handled remotely. The pharmacy is reaching out to the people handling this and asking them to get in touch with us.  "

## 2022-04-08 NOTE — TELEPHONE ENCOUNTER
Left voicemail message for patient to return my call.  She needs to contact [facility] at [phone] if they haven't contacted her yet regarding her prescription for Tymlos.

## 2022-04-08 NOTE — TELEPHONE ENCOUNTER
[facility] completed the prior auth for the Rx and it was approved but the patient’s insurance dictates that the Rx must be dispensed by [facility].   [facility] attempted to override that so they could deliver it to the patient but it was denied. They transferred it to [facility] so that pharmacy should be managing the Rx from here.  Jacqueline repClaudy Wen, recommends that the patient give [facility] a call if they haven’t been contacted by them already.  I will contact the patient with this information.

## 2022-04-29 ENCOUNTER — APPOINTMENT (OUTPATIENT)
Dept: URBAN - METROPOLITAN AREA CLINIC 200 | Age: 57
Setting detail: DERMATOLOGY
End: 2022-05-01

## 2022-04-29 DIAGNOSIS — Z11.52 ENCOUNTER FOR SCREENING FOR COVID-19: ICD-10-CM

## 2022-04-29 DIAGNOSIS — D22 MELANOCYTIC NEVI: ICD-10-CM

## 2022-04-29 DIAGNOSIS — L57.8 OTHER SKIN CHANGES DUE TO CHRONIC EXPOSURE TO NONIONIZING RADIATION: ICD-10-CM

## 2022-04-29 DIAGNOSIS — B35.3 TINEA PEDIS: ICD-10-CM

## 2022-04-29 PROBLEM — D22.39 MELANOCYTIC NEVI OF OTHER PARTS OF FACE: Status: ACTIVE | Noted: 2022-04-29

## 2022-04-29 PROBLEM — J30.1 ALLERGIC RHINITIS DUE TO POLLEN: Status: ACTIVE | Noted: 2022-04-29

## 2022-04-29 PROCEDURE — OTHER COUNSELING: OTHER

## 2022-04-29 PROCEDURE — OTHER PRESCRIPTION MEDICATION MANAGEMENT: OTHER

## 2022-04-29 PROCEDURE — OTHER SUNSCREEN RECOMMENDATIONS: OTHER

## 2022-04-29 PROCEDURE — OTHER SCREENING FOR COVID-19: OTHER

## 2022-04-29 PROCEDURE — 99213 OFFICE O/P EST LOW 20 MIN: CPT

## 2022-04-29 ASSESSMENT — LOCATION ZONE DERM
LOCATION ZONE: TRUNK
LOCATION ZONE: TOE
LOCATION ZONE: FACE

## 2022-04-29 ASSESSMENT — LOCATION DETAILED DESCRIPTION DERM
LOCATION DETAILED: LEFT LATERAL 4TH TOE
LOCATION DETAILED: RIGHT SUPERIOR LATERAL MALAR CHEEK
LOCATION DETAILED: RIGHT MEDIAL 5TH TOE
LOCATION DETAILED: PERIUMBILICAL SKIN

## 2022-04-29 ASSESSMENT — LOCATION SIMPLE DESCRIPTION DERM
LOCATION SIMPLE: RIGHT 5TH TOE
LOCATION SIMPLE: ABDOMEN
LOCATION SIMPLE: RIGHT CHEEK
LOCATION SIMPLE: LEFT 4TH TOE

## 2022-04-29 NOTE — PROCEDURE: PRESCRIPTION MEDICATION MANAGEMENT
Render In Strict Bullet Format?: No
Plan: Recommended OTC treatments and medications for specific fungi  -lamicil
Detail Level: Detailed
Plan: Recommended [facility] for hairy nevi

## 2022-04-29 NOTE — PROCEDURE: COUNSELING
Detail Level: Generalized Abdomen soft, nontender, nondistended, bowel sounds present in all 4 quadrants.

## 2022-09-28 ENCOUNTER — TELEMEDICINE (OUTPATIENT)
Dept: ENDOCRINOLOGY | Facility: CLINIC | Age: 57
End: 2022-09-28
Payer: COMMERCIAL

## 2022-09-28 DIAGNOSIS — M81.0 OSTEOPOROSIS WITHOUT CURRENT PATHOLOGICAL FRACTURE, UNSPECIFIED OSTEOPOROSIS TYPE: Primary | ICD-10-CM

## 2022-09-28 PROCEDURE — 99214 OFFICE O/P EST MOD 30 MIN: CPT | Mod: 95 | Performed by: INTERNAL MEDICINE

## 2022-09-28 NOTE — PATIENT INSTRUCTIONS
- continue tymlos      - vitamin D3, 2000 IU    - calcium plus D 600 mg, one pill daily with food    - regular exercise    - blood test now  Send a portal message OR Call about the results if you don't hear in one week:  [phone]  Prompt 4  Prompt 1  And leave a message    I will plan to send your lab request for next year    - follow up in early September/end of August next year, with repeat blood test  We will plan your treatment for when you finish the tymlos.

## 2022-09-28 NOTE — PROGRESS NOTES
Endocrinology Follow up Note     TELEMEDICINE VISIT    Verification of Patient Location:   The patient affirms they are currently located in the following state: [place]   Request for Consent:   Audio and Video Encounter   Aljeandrina, my name is Lorenza Yi MD.  Before we proceed, can you please verify your identification by telling me your full name and date of birth?  Can you tell me who is in the room with you?    You and I are about to have a telemedicine check-in or visit because you have requested it.  This is a live video-conference.  I am a real person, speaking to you in real time.  There is no one else with me on the video-conference.  However, when we use ([employer], [employer], etc) it is important for you to know that the video-conference may not be secure or private.  I am not recording this conversation and I am asking you not to record it.  This telemedicine visit will be billed to your health insurance or you, if you are self-insured.  You understand you will be responsible for any copayments or coinsurances that apply to your telemedicine visit.  Communication platform used for this encounter:  Doximity     Before starting our telemedicine visit, I am required to get your consent for this virtual check-in or visit by telemedicine. Do you consent?     Patient Response to Request for Consent:   Yes   Time Spent:   I spent 25 minutes on this date of service performing the following activities: obtaining history, entering orders, documenting, preparing for visit, obtaining / reviewing records, independently reviewing study/studies and communicating results.      Patient Name: Jacqueline Reid  MR#: [id number]  : 1965  Consultant: Lorenza Yi MD      Jacqueline Reid is a 57 y.o. female who presents for evaluation for osteoporosis    HPI:  She had work up for secondary causes of osteoporosis after her visit in 2022  She started treatment with Tymlos on 2022.  She is  tolerating it well.       She had left wrist fracture on 10/30/21, she fell sideways (was holding on to a tree, but had to let go) onto her left side. She had surgery on 2021.   Her ortho recommended a bone density. The radius is healed. 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  It is now healing well. She is doing PT twice a week.   Left wrist fracture as a child (age 7), left collar bone fracture (around age 5)  Left toe fracture.       OSTEOPOROSIS HISTORY:  Fractures: see above  Kidney stones: none  Chronic steroids: none  Seizure medications: none  Acid reflux: none  History of radiation: none  Dental problems: none  Cigarette smoking: never  Alcohol: once a week or less, 1 serving  Caffeine: one cup of coffee per day  Pregnancies: 2 (  and )  LMP age 51  No HRT  No parental history of hip fracture      Supplements:  - calcium plus D 600 mg, one pill daily with food  - vitamin D3 2000 IU daily, started 2022    Diet  Milk with cereal 1 to 2 times per week  Greek yogurt almost daily  Cheese most days    Exercise  Started going back to the gym spring 2022, elliptical/bike and weight machines along light hand weights. 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      Medical History:  Past Medical History:   Diagnosis Date    Hyperlipidemia     Hypertension     Osteoporosis        Surgical History:   Past Surgical History:   Procedure Laterality Date     SECTION      WRIST SURGERY  2021       Family History:  Family History   Problem Relation Age of Onset    Diabetes Biological Mother     Lung cancer Biological Mother     Hypertension Biological Mother     Hyperlipidemia Biological Mother     Kidney disease Biological Mother     Prostate cancer Biological Father     Hypertension Biological Father     Hyperlipidemia  Biological Father     Stroke Biological Father     Diabetes Biological Sister     Hypertension Biological Sister     Hyperlipidemia Biological Sister     Hypertension Biological Brother     Hyperlipidemia Biological Brother     Heart disease Biological Brother     Heart attack Biological Brother         Social history:  Social History     Socioeconomic History    Marital status:      Spouse name: Not on file    Number of children: Not on file    Years of education: Not on file    Highest education level: Not on file   Occupational History    Not on file   Tobacco Use    Smoking status: Never Smoker    Smokeless tobacco: Never Used   Substance and Sexual Activity    Alcohol use: Yes    Drug use: Not on file    Sexual activity: Defer   Other Topics Concern    Not on file   Social History Narrative    Not on file     Social Determinants of Health     Financial Resource Strain: Not on file   Food Insecurity: Not on file   Transportation Needs: Not on file   Physical Activity: Not on file   Stress: Not on file   Social Connections: Not on file   Intimate Partner Violence: Not on file   Housing Stability: Not on file       Medication(active prior to today):  Current Outpatient Medications   Medication Sig Dispense Refill    calcium carbonate-vitamin D3 500 mg-10 mcg (400 unit) tablet Take 1 tablet by mouth daily.      cetirizine (ZyrTEC) 10 mg tablet Take 10 mg by mouth daily.      CRESTOR 10 mg tablet       olmesartan (BENICAR) 20 mg tablet Take 20 mg by mouth daily.      TYMLOS 80 mcg (3,120 mcg/1.56 mL) pen injector injection Inject 0.04 mL (80 mcg total) under the skin daily. 3 Box 3     No current facility-administered medications for this visit.       Allergies:  Banana, Kiwi, Quinolones, and Sulfa (sulfonamide antibiotics)    Review of Systems  All other systems reviewed and negative except as noted in HPI    There were no vitals filed for this visit.  There is no height or weight on  file to calculate BMI.    Physical Exam    Lab Results   Component Value Date    CREATININE 0.90 03/24/2022    K 4.2 03/24/2022     Lab Results   Component Value Date    ALT 13 03/24/2022           LABS:  12/28/2021  Free T4 1.3  Cholesterol 174  HDL 64  TG 80  LDL 93  Albumin 4.5  Globulin 2.7  And alk phos 88  ALT 12  TSH 3.29      4/1/2021  TSH 5.13  BG 90  Creatinine 0.97  Sodium 141  Calcium 9.8  Albumin 4.8  Alk phos 74  ALT 12  Free T4 1.1  Hemoglobin 14.4      [facility]A 12/29/2021 ([facility])  Bone densitometry of the PA lumbar spine and left hip was performed using a Hologic bone densitometer. The patient is well positioned and the regions of interest are properly placed.     PA spine L1-L4       BMD:  0.673 g/cm2     T-score:  -3.4,   osteoporosis       Total hip                   BMD:  0.626 g/cm2     T-score:  -2.6,   osteoporosis         Femoral neck          BMD:  0.529 g/cm2     T-score:  -2.9,   osteoporosis       Assessment/Plan   Osteoporosis  - s/p left wrist fracture with difficulty healing  - very low T-score  - consistent with severe osteoporosis  -  work up for secondary causes of osteoporosis was negative in March 2022  - she is a candidate for anabolic bone therapy  - she started treatment with Tymlos on 4/14/2022  - she understands that we use Tymlos for 18 months and it has be followed by treatment with an antiresorptive agent  - I discussed her calcium and vitamin D supplement  - long discussion about exercise.       Problem List Items Addressed This Visit    None     Visit Diagnoses     Osteoporosis without current pathological fracture, unspecified osteoporosis type    -  Primary    Relevant Orders    Comprehensive metabolic panel    Vitamin D 25 hydroxy        Patient Instructions   - continue tymlos      - vitamin D3, 2000 IU    - calcium plus D 600 mg, one pill daily with food    - regular exercise    - blood test now  Send a portal message OR Call about the results  if you don't hear in one week:  [phone]  Prompt 4  Prompt 1  And leave a message    I will plan to send your lab request for next year    - follow up in early September/end of August next year, with repeat blood test  We will plan your treatment for when you finish the tymlos.           Lorenza Yi MD

## 2022-11-04 LAB
25(OH)D3 SERPL-MCNC: 36 NG/ML (ref 30–100)
ALBUMIN SERPL-MCNC: 4.3 G/DL (ref 3.6–5.1)
ALBUMIN/GLOB SERPL: 1.4 (CALC) (ref 1–2.5)
ALP SERPL-CCNC: 85 U/L (ref 37–153)
ALT SERPL-CCNC: 14 U/L (ref 6–29)
AST SERPL-CCNC: 17 U/L (ref 10–35)
BILIRUB SERPL-MCNC: 0.5 MG/DL (ref 0.2–1.2)
BUN SERPL-MCNC: 20 MG/DL (ref 7–25)
BUN/CREAT SERPL: NORMAL (CALC) (ref 6–22)
CALCIUM SERPL-MCNC: 10 MG/DL (ref 8.6–10.4)
CHLORIDE SERPL-SCNC: 100 MMOL/L (ref 98–110)
CO2 SERPL-SCNC: 31 MMOL/L (ref 20–32)
CREAT SERPL-MCNC: 0.97 MG/DL (ref 0.5–1.03)
EGFRCR SERPLBLD CKD-EPI 2021: 68 ML/MIN/1.73M2
GLOBULIN SER CALC-MCNC: 3.1 G/DL (CALC) (ref 1.9–3.7)
GLUCOSE SERPL-MCNC: 119 MG/DL (ref 65–139)
POTASSIUM SERPL-SCNC: 4.1 MMOL/L (ref 3.5–5.3)
PROT SERPL-MCNC: 7.4 G/DL (ref 6.1–8.1)
SODIUM SERPL-SCNC: 139 MMOL/L (ref 135–146)

## 2022-11-29 ENCOUNTER — APPOINTMENT (OUTPATIENT)
Dept: URBAN - METROPOLITAN AREA CLINIC 203 | Age: 57
Setting detail: DERMATOLOGY
End: 2022-11-29

## 2022-11-29 DIAGNOSIS — Z11.52 ENCOUNTER FOR SCREENING FOR COVID-19: ICD-10-CM

## 2022-11-29 DIAGNOSIS — L57.8 OTHER SKIN CHANGES DUE TO CHRONIC EXPOSURE TO NONIONIZING RADIATION: ICD-10-CM

## 2022-11-29 PROCEDURE — OTHER SCREENING FOR COVID-19: OTHER

## 2022-11-29 PROCEDURE — OTHER SUNSCREEN RECOMMENDATIONS: OTHER

## 2022-11-29 PROCEDURE — OTHER COUNSELING: OTHER

## 2022-11-29 PROCEDURE — OTHER MIPS QUALITY: OTHER

## 2022-11-29 PROCEDURE — OTHER PRESCRIPTION MEDICATION MANAGEMENT: OTHER

## 2022-11-29 PROCEDURE — 99213 OFFICE O/P EST LOW 20 MIN: CPT

## 2022-11-29 ASSESSMENT — LOCATION ZONE DERM: LOCATION ZONE: TRUNK

## 2022-11-29 ASSESSMENT — LOCATION DETAILED DESCRIPTION DERM
LOCATION DETAILED: LEFT MEDIAL BREAST 11-12:00 REGION
LOCATION DETAILED: EPIGASTRIC SKIN
LOCATION DETAILED: LEFT MEDIAL BREAST 10-11:00 REGION

## 2022-11-29 ASSESSMENT — LOCATION SIMPLE DESCRIPTION DERM
LOCATION SIMPLE: ABDOMEN
LOCATION SIMPLE: LEFT BREAST

## 2022-11-29 NOTE — PROCEDURE: PRESCRIPTION MEDICATION MANAGEMENT
Detail Level: Zone
Render In Strict Bullet Format?: No
Plan: Recommend vanicream  and to do a patch test on arm for 3 days

## 2023-03-13 ENCOUNTER — TELEPHONE (OUTPATIENT)
Dept: ENDOCRINOLOGY | Facility: CLINIC | Age: 58
End: 2023-03-13
Payer: COMMERCIAL

## 2023-03-13 NOTE — TELEPHONE ENCOUNTER
RN received refill fax from [facility] pharmacy for Tymlos.  Can Tymlos be refilled?

## 2023-06-13 RX ORDER — ABALOPARATIDE 2000 UG/ML
INJECTION, SOLUTION SUBCUTANEOUS
Qty: 4.68 ML | Refills: 1 | Status: SHIPPED | OUTPATIENT
Start: 2023-06-13

## 2023-08-16 ENCOUNTER — OFFICE VISIT (OUTPATIENT)
Dept: ENDOCRINOLOGY | Facility: CLINIC | Age: 58
End: 2023-08-16
Payer: COMMERCIAL

## 2023-08-16 VITALS
OXYGEN SATURATION: 99 % | RESPIRATION RATE: 19 BRPM | SYSTOLIC BLOOD PRESSURE: 120 MMHG | DIASTOLIC BLOOD PRESSURE: 74 MMHG | BODY MASS INDEX: 25.58 KG/M2 | HEIGHT: 62 IN | WEIGHT: 139 LBS | HEART RATE: 89 BPM

## 2023-08-16 DIAGNOSIS — M81.0 OSTEOPOROSIS WITHOUT CURRENT PATHOLOGICAL FRACTURE, UNSPECIFIED OSTEOPOROSIS TYPE: Primary | ICD-10-CM

## 2023-08-16 PROCEDURE — 99215 OFFICE O/P EST HI 40 MIN: CPT | Performed by: INTERNAL MEDICINE

## 2023-08-16 PROCEDURE — 3008F BODY MASS INDEX DOCD: CPT | Performed by: INTERNAL MEDICINE

## 2023-08-16 NOTE — PROGRESS NOTES
Endocrinology Follow up Note         Patient Name: Jacqueline Reid  MR#: [id number]  : 1965  Consultant: Lorenza Yi MD      Jacqueline Reid is a 58 y.o. female who presents for evaluation for osteoporosis    HPI:  She had work up for secondary causes of osteoporosis after her visit in 2022  She started treatment with Tymlos on 2022.  She is tolerating it well.     She had left wrist fracture on 10/30/21, she fell sideways (was holding on to a tree, but had to let go) onto her left side. She had surgery on 2021.   Her ortho recommended a bone density. The radius is healed. 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  It is now healing well. She is doing PT twice a week.   Left wrist fracture as a child (age 7), left collar bone fracture (around age 5)  Left toe fracture.       OSTEOPOROSIS HISTORY:  Fractures: see above  Kidney stones: none  Chronic steroids: none  Seizure medications: none  Acid reflux: none  History of radiation: none  Dental problems: none  Cigarette smoking: never  Alcohol: once a week or less, 1 serving  Caffeine: one cup of coffee per day  Pregnancies: 2 (  and )  LMP age 51  No HRT  No parental history of hip fracture      Supplements:  - calcium plus D 600 mg, one pill daily with food  - vitamin D3 2000 IU daily, started 2022    Diet  Milk with cereal once a week  Greek yogurt 3 to 4 times a week  Cheese most days    Exercise  Started going back to the gym spring 2022, elliptical/bike and weight machines along light hand weights. 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      Medical History:  Past Medical History:   Diagnosis Date   • Hyperlipidemia    • Hypertension    • Osteoporosis        Surgical History:   Past Surgical History:   Procedure Laterality Date   •  SECTION     •  WRIST SURGERY  11/08/2021       Family History:  Family History   Problem Relation Age of Onset   • Diabetes Biological Mother    • Lung cancer Biological Mother    • Hypertension Biological Mother    • Hyperlipidemia Biological Mother    • Kidney disease Biological Mother    • Prostate cancer Biological Father    • Hypertension Biological Father    • Hyperlipidemia Biological Father    • Stroke Biological Father    • Diabetes Biological Sister    • Hypertension Biological Sister    • Hyperlipidemia Biological Sister    • Hypertension Biological Brother    • Hyperlipidemia Biological Brother    • Heart disease Biological Brother    • Heart attack Biological Brother         Social history:  Social History     Socioeconomic History   • Marital status:      Spouse name: Not on file   • Number of children: Not on file   • Years of education: Not on file   • Highest education level: Not on file   Occupational History   • Not on file   Tobacco Use   • Smoking status: Never   • Smokeless tobacco: Never   Substance and Sexual Activity   • Alcohol use: Yes   • Drug use: Not on file   • Sexual activity: Defer   Other Topics Concern   • Not on file   Social History Narrative   • Not on file     Social Determinants of Health     Financial Resource Strain: Not on file   Food Insecurity: Not on file   Transportation Needs: Not on file   Physical Activity: Not on file   Stress: Not on file   Social Connections: Not on file   Intimate Partner Violence: Not on file   Housing Stability: Not on file       Medication(active prior to today):  Current Outpatient Medications   Medication Sig Dispense Refill   • calcium carbonate-vitamin D3 500 mg-10 mcg (400 unit) tablet Take 1 tablet by mouth daily.     • cetirizine (ZyrTEC) 10 mg tablet Take 10 mg by mouth daily.     • CRESTOR 10 mg tablet      • olmesartan (BENICAR) 20 mg tablet Take 20 mg by mouth daily.     • TYMLOS 80 mcg (3,120 mcg/1.56 mL) pen injector injection INJECT 80  "MCG UNDER THE SKIN DAILY. DISCARD 30 DAYS AFTER INITIAL USE 4.68 mL 1     No current facility-administered medications for this visit.       Allergies:  Banana, Kiwi, Quinolones, and Sulfa (sulfonamide antibiotics)    Review of Systems  All other systems reviewed and negative except as noted in HPI    Vitals:    08/16/23 1121   BP: 120/74   BP Location: Left upper arm   Patient Position: Sitting   Pulse: 89   Resp: 19   SpO2: 99%   Weight: 63 kg (139 lb)   Height: 1.562 m (5' 1.5\")     Body mass index is 25.84 kg/m².    Physical Exam    Lab Results   Component Value Date    CREATININE 0.96 08/08/2023    K 4.4 08/08/2023     Lab Results   Component Value Date    TSH 4.16 08/08/2023    ALT 10 08/08/2023           LABS:  12/28/2021  Free T4 1.3  Cholesterol 174  HDL 64  TG 80  LDL 93  Albumin 4.5  Globulin 2.7  And alk phos 88  ALT 12  TSH 3.29      4/1/2021  TSH 5.13  BG 90  Creatinine 0.97  Sodium 141  Calcium 9.8  Albumin 4.8  Alk phos 74  ALT 12  Free T4 1.1  Hemoglobin 14.4      DEXA 12/29/2021 ([facility])  Bone densitometry of the PA lumbar spine and left hip was performed using a Hologic bone densitometer. The patient is well positioned and the regions of interest are properly placed.     PA spine L1-L4       BMD:  0.673 g/cm2     T-score:  -3.4,   osteoporosis       Total hip                   BMD:  0.626 g/cm2     T-score:  -2.6,   osteoporosis         Femoral neck          BMD:  0.529 g/cm2     T-score:  -2.9,   osteoporosis       Assessment/Plan   Osteoporosis  - s/p left wrist fracture with difficulty healing  - very low T-score  - consistent with severe osteoporosis  -  work up for secondary causes of osteoporosis was negative in March 2022  - she is a candidate for anabolic bone therapy  - she started treatment with Tymlos on 4/14/2022  See patient instructions for plan        Problem List Items Addressed This Visit    None  Visit Diagnoses     Osteoporosis without current " pathological fracture, unspecified osteoporosis type    -  Primary    Relevant Orders    Comprehensive metabolic panel    TSH w reflex FT4    Vitamin D 25 hydroxy    DEXA BONE DENSITY        Patient Instructions   - finish up your current supply of Tymlos    - plan prolia injection under the skin every 6 months, starting November 2023  Plan for treatment for 2 years  To be followed by likely one dose of Reclast IV, then holiday    - get the bone density in January 2024, as a new baseline  Same place as before    - follow up in May 2024 for visit and prolia  Get blood test before visit in May        I spent 40 minutes on this date of service performing the following activities: obtaining history, performing examination, entering orders, documenting, preparing for visit, obtaining / reviewing records, providing counseling and education, independently reviewing study/studies and coordinating care.    Lorenza Yi MD

## 2023-08-16 NOTE — LETTER
2023     Devin Borja MD  [address]  [facility]     Patient: Jacqueline Reid  YOB: 1965  Date of Visit: 2023      Dear Dr. Borja:    Thank you for referring Jacqueline Reid to me for evaluation. Below are my notes for this consultation.    If you have questions, please do not hesitate to call me. I look forward to following your patient along with you.         Sincerely,        Lorenza Yi MD        CC: No Recipients    Lorenza Yi MD  2023 10:55 PM  Signed         Endocrinology Follow up Note         Patient Name: Jacqueline Reid  MR#: [id number]  : 1965  Consultant: Lorenza Yi MD      Jacqueline Reid is a 58 y.o. female who presents for evaluation for osteoporosis    HPI:  She had work up for secondary causes of osteoporosis after her visit in 2022  She started treatment with Tymlos on 2022.  She is tolerating it well.     She had left wrist fracture on 10/30/21, she fell sideways (was holding on to a tree, but had to let go) onto her left side. She had surgery on 2021.   Her ortho recommended a bone density. The radius is healed. 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  It is now healing well. She is doing PT twice a week.   Left wrist fracture as a child (age 7), left collar bone fracture (around age 5)  Left toe fracture.       OSTEOPOROSIS HISTORY:  Fractures: see above  Kidney stones: none  Chronic steroids: none  Seizure medications: none  Acid reflux: none  History of radiation: none  Dental problems: none  Cigarette smoking: never  Alcohol: once a week or less, 1 serving  Caffeine: one cup of coffee per day  Pregnancies: 2 (  and )  LMP age 51  No HRT  No parental history of hip fracture      Supplements:  - calcium plus D 600 mg, one pill daily with food  - vitamin D3 2000 IU daily, started 2022    Diet  Milk with cereal once a week  Greek yogurt 3 to 4 times a week  Cheese  most days    Exercise  Started going back to the gym spring 2022, elliptical/bike and weight machines along light hand weights. 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      Medical History:  Past Medical History:   Diagnosis Date   • Hyperlipidemia    • Hypertension    • Osteoporosis        Surgical History:   Past Surgical History:   Procedure Laterality Date   •  SECTION     • WRIST SURGERY  2021       Family History:  Family History   Problem Relation Age of Onset   • Diabetes Biological Mother    • Lung cancer Biological Mother    • Hypertension Biological Mother    • Hyperlipidemia Biological Mother    • Kidney disease Biological Mother    • Prostate cancer Biological Father    • Hypertension Biological Father    • Hyperlipidemia Biological Father    • Stroke Biological Father    • Diabetes Biological Sister    • Hypertension Biological Sister    • Hyperlipidemia Biological Sister    • Hypertension Biological Brother    • Hyperlipidemia Biological Brother    • Heart disease Biological Brother    • Heart attack Biological Brother         Social history:  Social History     Socioeconomic History   • Marital status:      Spouse name: Not on file   • Number of children: Not on file   • Years of education: Not on file   • Highest education level: Not on file   Occupational History   • Not on file   Tobacco Use   • Smoking status: Never   • Smokeless tobacco: Never   Substance and Sexual Activity   • Alcohol use: Yes   • Drug use: Not on file   • Sexual activity: Defer   Other Topics Concern   • Not on file   Social History Narrative   • Not on file     Social Determinants of Health     Financial Resource Strain: Not on file   Food Insecurity: Not on file   Transportation Needs: Not on file   Physical Activity: Not on file   Stress: Not on file   Social  "Connections: Not on file   Intimate Partner Violence: Not on file   Housing Stability: Not on file       Medication(active prior to today):  Current Outpatient Medications   Medication Sig Dispense Refill   • calcium carbonate-vitamin D3 500 mg-10 mcg (400 unit) tablet Take 1 tablet by mouth daily.     • cetirizine (ZyrTEC) 10 mg tablet Take 10 mg by mouth daily.     • CRESTOR 10 mg tablet      • olmesartan (BENICAR) 20 mg tablet Take 20 mg by mouth daily.     • TYMLOS 80 mcg (3,120 mcg/1.56 mL) pen injector injection INJECT 80 MCG UNDER THE SKIN DAILY. DISCARD 30 DAYS AFTER INITIAL USE 4.68 mL 1     No current facility-administered medications for this visit.       Allergies:  Banana, Kiwi, Quinolones, and Sulfa (sulfonamide antibiotics)    Review of Systems  All other systems reviewed and negative except as noted in HPI    Vitals:    08/16/23 1121   BP: 120/74   BP Location: Left upper arm   Patient Position: Sitting   Pulse: 89   Resp: 19   SpO2: 99%   Weight: 63 kg (139 lb)   Height: 1.562 m (5' 1.5\")     Body mass index is 25.84 kg/m².    Physical Exam    Lab Results   Component Value Date    CREATININE 0.96 08/08/2023    K 4.4 08/08/2023     Lab Results   Component Value Date    TSH 4.16 08/08/2023    ALT 10 08/08/2023           LABS:  12/28/2021  Free T4 1.3  Cholesterol 174  HDL 64  TG 80  LDL 93  Albumin 4.5  Globulin 2.7  And alk phos 88  ALT 12  TSH 3.29      4/1/2021  TSH 5.13  BG 90  Creatinine 0.97  Sodium 141  Calcium 9.8  Albumin 4.8  Alk phos 74  ALT 12  Free T4 1.1  Hemoglobin 14.4      DEXA 12/29/2021 ([facility])  Bone densitometry of the PA lumbar spine and left hip was performed using a Hologic bone densitometer. The patient is well positioned and the regions of interest are properly placed.     PA spine L1-L4       BMD:  0.673 g/cm2     T-score:  -3.4,   osteoporosis       Total hip                   BMD:  0.626 g/cm2     T-score:  -2.6,   osteoporosis         Femoral neck "          BMD:  0.529 g/cm2     T-score:  -2.9,   osteoporosis       Assessment/Plan   Osteoporosis  - s/p left wrist fracture with difficulty healing  - very low T-score  - consistent with severe osteoporosis  -  work up for secondary causes of osteoporosis was negative in March 2022  - she is a candidate for anabolic bone therapy  - she started treatment with Tymlos on 4/14/2022  See patient instructions for plan        Problem List Items Addressed This Visit    None  Visit Diagnoses     Osteoporosis without current pathological fracture, unspecified osteoporosis type    -  Primary    Relevant Orders    Comprehensive metabolic panel    TSH w reflex FT4    Vitamin D 25 hydroxy    DEXA BONE DENSITY        Patient Instructions   - finish up your current supply of Tymlos    - plan prolia injection under the skin every 6 months, starting November 2023  Plan for treatment for 2 years  To be followed by likely one dose of Reclast IV, then holiday    - get the bone density in January 2024, as a new baseline  Same place as before    - follow up in May 2024 for visit and prolia  Get blood test before visit in May        I spent 40 minutes on this date of service performing the following activities: obtaining history, performing examination, entering orders, documenting, preparing for visit, obtaining / reviewing records, providing counseling and education, independently reviewing study/studies and coordinating care.    Lorenza Yi MD

## 2023-08-16 NOTE — PATIENT INSTRUCTIONS
- finish up your current supply of Tymlos    - plan prolia injection under the skin every 6 months, starting November 2023  Plan for treatment for 2 years  To be followed by likely one dose of Reclast IV, then holiday    - get the bone density in January 2024, as a new baseline  Same place as before    - follow up in May 2024 for visit and prolia  Get blood test before visit in May

## 2023-10-17 ENCOUNTER — TELEPHONE (OUTPATIENT)
Dept: ENDOCRINOLOGY | Facility: CLINIC | Age: 58
End: 2023-10-17
Payer: COMMERCIAL

## 2023-11-09 ENCOUNTER — CLINICAL SUPPORT (OUTPATIENT)
Dept: ENDOCRINOLOGY | Facility: CLINIC | Age: 58
End: 2023-11-09
Payer: COMMERCIAL

## 2023-11-09 DIAGNOSIS — M81.0 OSTEOPOROSIS WITHOUT CURRENT PATHOLOGICAL FRACTURE, UNSPECIFIED OSTEOPOROSIS TYPE: Primary | ICD-10-CM

## 2023-11-09 PROCEDURE — 96372 THER/PROPH/DIAG INJ SC/IM: CPT | Performed by: INTERNAL MEDICINE

## 2023-12-04 ENCOUNTER — APPOINTMENT (OUTPATIENT)
Dept: URBAN - METROPOLITAN AREA CLINIC 203 | Age: 58
Setting detail: DERMATOLOGY
End: 2023-12-12

## 2023-12-04 DIAGNOSIS — L57.8 OTHER SKIN CHANGES DUE TO CHRONIC EXPOSURE TO NONIONIZING RADIATION: ICD-10-CM

## 2023-12-04 PROCEDURE — OTHER COUNSELING: OTHER

## 2023-12-04 PROCEDURE — 99213 OFFICE O/P EST LOW 20 MIN: CPT

## 2023-12-04 PROCEDURE — OTHER SUNSCREEN RECOMMENDATIONS: OTHER

## 2023-12-04 ASSESSMENT — LOCATION DETAILED DESCRIPTION DERM
LOCATION DETAILED: LEFT MEDIAL BREAST 11-12:00 REGION
LOCATION DETAILED: LEFT MEDIAL BREAST 10-11:00 REGION

## 2023-12-04 ASSESSMENT — LOCATION ZONE DERM: LOCATION ZONE: TRUNK

## 2023-12-04 ASSESSMENT — LOCATION SIMPLE DESCRIPTION DERM: LOCATION SIMPLE: LEFT BREAST

## 2024-01-02 ENCOUNTER — TELEPHONE (OUTPATIENT)
Dept: ENDOCRINOLOGY | Facility: CLINIC | Age: 59
End: 2024-01-02
Payer: COMMERCIAL

## 2024-05-11 LAB
25(OH)D3+25(OH)D2 SERPL-MCNC: 52 NG/ML (ref 30–100)
ALBUMIN SERPL-MCNC: 4.6 G/DL (ref 3.6–5.1)
ALBUMIN/GLOB SERPL: 1.5 (CALC) (ref 1–2.5)
ALP SERPL-CCNC: 75 U/L (ref 37–153)
ALT SERPL-CCNC: 13 U/L (ref 6–29)
AST SERPL-CCNC: 18 U/L (ref 10–35)
BILIRUB SERPL-MCNC: 0.5 MG/DL (ref 0.2–1.2)
BUN SERPL-MCNC: 21 MG/DL (ref 7–25)
BUN/CREAT SERPL: ABNORMAL (CALC) (ref 6–22)
CALCIUM SERPL-MCNC: 9.7 MG/DL (ref 8.6–10.4)
CHLORIDE SERPL-SCNC: 101 MMOL/L (ref 98–110)
CO2 SERPL-SCNC: 29 MMOL/L (ref 20–32)
CREAT SERPL-MCNC: 0.99 MG/DL (ref 0.5–1.03)
EGFRCR SERPLBLD CKD-EPI 2021: 66 ML/MIN/1.73M2
GLOBULIN SER CALC-MCNC: 3 G/DL (CALC) (ref 1.9–3.7)
GLUCOSE SERPL-MCNC: 100 MG/DL (ref 65–99)
POTASSIUM SERPL-SCNC: 4.4 MMOL/L (ref 3.5–5.3)
PROT SERPL-MCNC: 7.6 G/DL (ref 6.1–8.1)
SODIUM SERPL-SCNC: 140 MMOL/L (ref 135–146)
TSH SERPL-ACNC: 1.89 MIU/L (ref 0.4–4.5)

## 2024-05-14 ENCOUNTER — OFFICE VISIT (OUTPATIENT)
Dept: ENDOCRINOLOGY | Facility: CLINIC | Age: 59
End: 2024-05-14
Payer: COMMERCIAL

## 2024-05-14 ENCOUNTER — CLINICAL SUPPORT (OUTPATIENT)
Dept: ENDOCRINOLOGY | Facility: CLINIC | Age: 59
End: 2024-05-14
Payer: COMMERCIAL

## 2024-05-14 VITALS
DIASTOLIC BLOOD PRESSURE: 80 MMHG | OXYGEN SATURATION: 99 % | HEIGHT: 62 IN | WEIGHT: 140.8 LBS | HEART RATE: 80 BPM | BODY MASS INDEX: 25.91 KG/M2 | SYSTOLIC BLOOD PRESSURE: 110 MMHG | RESPIRATION RATE: 16 BRPM

## 2024-05-14 DIAGNOSIS — M81.0 OSTEOPOROSIS WITHOUT CURRENT PATHOLOGICAL FRACTURE, UNSPECIFIED OSTEOPOROSIS TYPE: Primary | ICD-10-CM

## 2024-05-14 DIAGNOSIS — M81.0 AGE-RELATED OSTEOPOROSIS WITHOUT CURRENT PATHOLOGICAL FRACTURE: Primary | ICD-10-CM

## 2024-05-14 PROCEDURE — 96372 THER/PROPH/DIAG INJ SC/IM: CPT | Performed by: INTERNAL MEDICINE

## 2024-05-14 PROCEDURE — 99215 OFFICE O/P EST HI 40 MIN: CPT | Mod: 25 | Performed by: INTERNAL MEDICINE

## 2024-05-14 PROCEDURE — 3008F BODY MASS INDEX DOCD: CPT | Performed by: INTERNAL MEDICINE

## 2024-05-14 NOTE — PATIENT INSTRUCTIONS
- blood sugar number is  minimally off one time  Continuing eating healthy and your exercise    - thyroid levels are normal    - bone density January 2024,showed very good improvement    - vitamin D level is very good    - same dose supplements    -your exercise is excellent    - prolia today    - plan to continue prolia every 6 months, at least till the next bone density in January 2026.     - prolia in 6 months    - follow up and prolia in one year  Labs in one year before the visit.

## 2024-05-14 NOTE — PROGRESS NOTES
Endocrinology Follow up Note         Patient Name: Jacqueline Reid  MR#: [id number]  : 1965  Consultant: Lorenza Yi MD      Jacqueline Reid is a 58 y.o. female who presents for evaluation for osteoporosis    HPI:  She had work up for secondary causes of osteoporosis after her visit in 2022  She started treatment with Tymlos on 2022.  She finished Tymlos 2023  First dose of prolia: 23  Well tolerated  DEXA 24 showed improvement      She had left wrist fracture on 10/30/21, she fell sideways (was holding on to a tree, but had to let go) onto her left side. She had surgery on 2021.   Her ortho recommended a bone density. The radius is healed. 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  It is now healing well. She is doing PT twice a week.   Left wrist fracture as a child (age 7), left collar bone fracture (around age 5)  Left toe fracture.       OSTEOPOROSIS HISTORY:  Fractures: see above  Kidney stones: none  Chronic steroids: none  Seizure medications: none  Acid reflux: none  History of radiation: none  Dental problems: none  Cigarette smoking: never  Alcohol: once a week or less, 1 serving  Caffeine: one cup of coffee per day  Pregnancies: 2 (  and )  LMP age 51  No HRT  No parental history of hip fracture      Supplements:  - calcium plus D 600 mg, one pill daily with food  - vitamin D3 2000 IU daily, started 2022  - no multivitamin    Diet  Greek yogurt 3 to 4 times a week  Cheese most days    Exercise  Started going back to the gym spring 2022, elliptical/bike and weight machines along light hand weights. She goes 4 to 5 times a week  Water exercise classes.   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      Medical History:  Past Medical History:   Diagnosis Date    Hyperlipidemia     Hypertension     Osteoporosis         Surgical History:   Past Surgical History:   Procedure Laterality Date     SECTION      WRIST SURGERY  2021       Family History:  Family History   Problem Relation Age of Onset    Diabetes Biological Mother     Lung cancer Biological Mother     Hypertension Biological Mother     Hyperlipidemia Biological Mother     Kidney disease Biological Mother     Prostate cancer Biological Father     Hypertension Biological Father     Hyperlipidemia Biological Father     Stroke Biological Father     Diabetes Biological Sister     Hypertension Biological Sister     Hyperlipidemia Biological Sister     Hypertension Biological Brother     Hyperlipidemia Biological Brother     Heart disease Biological Brother     Heart attack Biological Brother         Social history:  Social History     Socioeconomic History    Marital status:      Spouse name: Not on file    Number of children: Not on file    Years of education: Not on file    Highest education level: Not on file   Occupational History    Not on file   Tobacco Use    Smoking status: Never    Smokeless tobacco: Never   Substance and Sexual Activity    Alcohol use: Yes    Drug use: Not on file    Sexual activity: Defer   Other Topics Concern    Not on file   Social History Narrative    Not on file     Social Determinants of Health     Financial Resource Strain: Not on file   Food Insecurity: Not on file   Transportation Needs: Not on file   Physical Activity: Not on file   Stress: Not on file   Social Connections: Not on file   Intimate Partner Violence: Not on file   Housing Stability: Not on file       Medication(active prior to today):  Current Outpatient Medications   Medication Sig Dispense Refill    calcium carbonate-vitamin D3 500 mg-10 mcg (400 unit) tablet Take 1 tablet by mouth daily.      cetirizine (ZyrTEC) 10 mg tablet Take 10 mg by mouth daily.      CRESTOR 10 mg tablet       olmesartan (BENICAR) 20 mg tablet Take 20 mg by mouth daily.       "TYMLOS 80 mcg (3,120 mcg/1.56 mL) pen injector injection INJECT 80 MCG UNDER THE SKIN DAILY. DISCARD 30 DAYS AFTER INITIAL USE 4.68 mL 1     No current facility-administered medications for this visit.       Allergies:  Banana, Kiwi, Quinolones, and Sulfa (sulfonamide antibiotics)    Review of Systems  All other systems reviewed and negative except as noted in HPI    Vitals:    05/14/24 1203   BP: 110/80   BP Location: Left upper arm   Patient Position: Sitting   Pulse: 80   Resp: 16   SpO2: 99%   Weight: 63.9 kg (140 lb 12.8 oz)   Height: 1.575 m (5' 2\")     Body mass index is 25.75 kg/m².    Physical Exam  Constitutional:       Appearance: She is well-developed.   HENT:      Head: Normocephalic and atraumatic.   Eyes:      Extraocular Movements: Extraocular movements intact.      Conjunctiva/sclera: Conjunctivae normal.   Neck:      Thyroid: No thyromegaly.      Trachea: No tracheal deviation.   Cardiovascular:      Rate and Rhythm: Normal rate and regular rhythm.   Pulmonary:      Effort: Pulmonary effort is normal.   Musculoskeletal:         General: No swelling or deformity.   Lymphadenopathy:      Cervical: No cervical adenopathy.   Skin:     General: Skin is warm and dry.      Findings: No bruising or rash.   Neurological:      Motor: No weakness or tremor.   Psychiatric:         Mood and Affect: Mood normal.         Behavior: Behavior normal.         Lab Results   Component Value Date    CREATININE 0.99 05/10/2024    K 4.4 05/10/2024     Lab Results   Component Value Date    TSH 1.89 05/10/2024    TSH 4.16 08/08/2023    ALT 13 05/10/2024     Results from last 7 days   Lab Units 05/10/24  1054   SODIUM mmol/L 140   POTASSIUM mmol/L 4.4   CHLORIDE mmol/L 101   CO2 mmol/L 29   BUN mg/dL 21   CREATININE mg/dL 0.99   CALCIUM mg/dL 9.7   ALBUMIN g/dL 4.6   BILIRUBIN TOTAL mg/dL 0.5   ALK PHOS U/L 75   ALT U/L 13   AST U/L 18   GLUCOSE mg/dL 100*       LABS:  12/28/2021  Free T4 1.3  Cholesterol 174  HDL 64  TG " 80  LDL 93  Albumin 4.5  Globulin 2.7  And alk phos 88  ALT 12  TSH 3.29      4/1/2021  TSH 5.13  BG 90  Creatinine 0.97  Sodium 141  Calcium 9.8  Albumin 4.8  Alk phos 74  ALT 12  Free T4 1.1  Hemoglobin 14.4      DEXA 12/29/2021 ([facility])  Bone densitometry of the PA lumbar spine and left hip was performed using a Hologic bone densitometer. The patient is well positioned and the regions of interest are properly placed.     PA spine L1-L4       BMD:  0.673 g/cm2     T-score:  -3.4,   osteoporosis       Total hip                   BMD:  0.626 g/cm2     T-score:  -2.6,   osteoporosis         Femoral neck          BMD:  0.529 g/cm2     T-score:  -2.9,   osteoporosis       BONE DENSITOMETRY 1/2/24 ([facility])    Clinical Indication: Menopause     Bone densitometry of the  AP lumbar spine, left hip and right hip was performed using a Hologic bone densitometer. The patient is well positioned and the regions of interest are properly placed.     Comparison: 12/29/2021         AP spine L1-L4                 BMD:  0.656 g/cm2    T-score: -3.6                                                                                Left: Total hip                    BMD: 0.679 g/cm2     T-score: -2.2                             Left: Femoral neck           BMD:  0.563 g/cm2     T-score:  -2.6       Right: Total hip                  BMD: 0.688 g/cm2     T-score:  -2.1                               Right: Femoral neck         BMD:  0.568 g/cm2      T-score:  -2.5         There is no statistically significant change in the bone density of the lumbar spine.   There is an increase of 8.5% in the bone density of the left hip.   There is no comparison of bone density for the right hip.     Assessment/Plan   Osteoporosis  - s/p left wrist fracture with difficulty healing  - very low T-score  - consistent with severe osteoporosis  -  work up for secondary causes of osteoporosis was negative in March  2022  - she is a candidate for anabolic bone therapy  - she took Tymlos from 4/14/2022 till October 2023.   - she started treatment with prolia on 11/9/23, she is tolerating it well  - DEXA 1/2/24 showed significant improvement  - continue Prolia  - continue adequate calcium and vitamin D intake  Regular exercise, safely  Plan to repeat DEXA January 2026        Problem List Items Addressed This Visit       Age-related osteoporosis without current pathological fracture - Primary       Patient Instructions   - blood sugar number is  minimally off one time  Continuing eating healthy and your exercise    - thyroid levels are normal    - bone density January 2024,showed very good improvement    - vitamin D level is very good    - same dose supplements    -your exercise is excellent    - prolia today    - plan to continue prolia every 6 months, at least till the next bone density in January 2026.     - prolia in 6 months    - follow up and prolia in one year  Labs in one year before the visit.       I spent 40 minutes on this date of service performing the following activities: obtaining history, performing examination, entering orders, documenting, preparing for visit, obtaining / reviewing records, providing counseling and education, independently reviewing study/studies and coordinating care.    Lorenza Yi MD

## 2024-05-14 NOTE — LETTER
May 14, 2024     Devin Borja MD  [address]  [facility]     Patient: Jacqueline Reid  YOB: 1965  Date of Visit: 2024      Dear Dr. Borja:    Thank you for referring Jacqueline Reid to me for evaluation. Below are my notes for this consultation.    If you have questions, please do not hesitate to call me. I look forward to following your patient along with you.         Sincerely,        Lorenza Yi MD        CC: No Recipients    Lorneza Yi MD  2024 10:48 PM  Sign when Signing Visit         Endocrinology Follow up Note         Patient Name: Jacqueline Reid  MR#: [id number]  : 1965  Consultant: Lorenza Yi MD      Jacqueline Reid is a 58 y.o. female who presents for evaluation for osteoporosis    HPI:  She had work up for secondary causes of osteoporosis after her visit in 2022  She started treatment with Tymlos on 2022.  She finished Tymlos 2023  First dose of prolia: 23  Well tolerated  DEXA 24 showed improvement      She had left wrist fracture on 10/30/21, she fell sideways (was holding on to a tree, but had to let go) onto her left side. She had surgery on 2021.   Her ortho recommended a bone density. The radius is healed. 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  It is now healing well. She is doing PT twice a week.   Left wrist fracture as a child (age 7), left collar bone fracture (around age 5)  Left toe fracture.       OSTEOPOROSIS HISTORY:  Fractures: see above  Kidney stones: none  Chronic steroids: none  Seizure medications: none  Acid reflux: none  History of radiation: none  Dental problems: none  Cigarette smoking: never  Alcohol: once a week or less, 1 serving  Caffeine: one cup of coffee per day  Pregnancies: 2 (  and )  LMP age 51  No HRT  No parental history of hip fracture      Supplements:  - calcium plus D 600 mg, one pill daily with food  - vitamin D3 2000 IU daily,  started 2022  - no multivitamin    Diet  Greek yogurt 3 to 4 times a week  Cheese most days    Exercise  Started going back to the gym spring 2022, elliptical/bike and weight machines along light hand weights. She goes 4 to 5 times a week  Water exercise classes.   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      Medical History:  Past Medical History:   Diagnosis Date   • Hyperlipidemia    • Hypertension    • Osteoporosis        Surgical History:   Past Surgical History:   Procedure Laterality Date   •  SECTION     • WRIST SURGERY  2021       Family History:  Family History   Problem Relation Age of Onset   • Diabetes Biological Mother    • Lung cancer Biological Mother    • Hypertension Biological Mother    • Hyperlipidemia Biological Mother    • Kidney disease Biological Mother    • Prostate cancer Biological Father    • Hypertension Biological Father    • Hyperlipidemia Biological Father    • Stroke Biological Father    • Diabetes Biological Sister    • Hypertension Biological Sister    • Hyperlipidemia Biological Sister    • Hypertension Biological Brother    • Hyperlipidemia Biological Brother    • Heart disease Biological Brother    • Heart attack Biological Brother         Social history:  Social History     Socioeconomic History   • Marital status:      Spouse name: Not on file   • Number of children: Not on file   • Years of education: Not on file   • Highest education level: Not on file   Occupational History   • Not on file   Tobacco Use   • Smoking status: Never   • Smokeless tobacco: Never   Substance and Sexual Activity   • Alcohol use: Yes   • Drug use: Not on file   • Sexual activity: Defer   Other Topics Concern   • Not on file   Social History Narrative   • Not on file     Social Determinants of Health     Financial Resource Strain: Not on file   Food Insecurity:  "Not on file   Transportation Needs: Not on file   Physical Activity: Not on file   Stress: Not on file   Social Connections: Not on file   Intimate Partner Violence: Not on file   Housing Stability: Not on file       Medication(active prior to today):  Current Outpatient Medications   Medication Sig Dispense Refill   • calcium carbonate-vitamin D3 500 mg-10 mcg (400 unit) tablet Take 1 tablet by mouth daily.     • cetirizine (ZyrTEC) 10 mg tablet Take 10 mg by mouth daily.     • CRESTOR 10 mg tablet      • olmesartan (BENICAR) 20 mg tablet Take 20 mg by mouth daily.     • TYMLOS 80 mcg (3,120 mcg/1.56 mL) pen injector injection INJECT 80 MCG UNDER THE SKIN DAILY. DISCARD 30 DAYS AFTER INITIAL USE 4.68 mL 1     No current facility-administered medications for this visit.       Allergies:  Banana, Kiwi, Quinolones, and Sulfa (sulfonamide antibiotics)    Review of Systems  All other systems reviewed and negative except as noted in HPI    Vitals:    05/14/24 1203   BP: 110/80   BP Location: Left upper arm   Patient Position: Sitting   Pulse: 80   Resp: 16   SpO2: 99%   Weight: 63.9 kg (140 lb 12.8 oz)   Height: 1.575 m (5' 2\")     Body mass index is 25.75 kg/m².    Physical Exam  Constitutional:       Appearance: She is well-developed.   HENT:      Head: Normocephalic and atraumatic.   Eyes:      Extraocular Movements: Extraocular movements intact.      Conjunctiva/sclera: Conjunctivae normal.   Neck:      Thyroid: No thyromegaly.      Trachea: No tracheal deviation.   Cardiovascular:      Rate and Rhythm: Normal rate and regular rhythm.   Pulmonary:      Effort: Pulmonary effort is normal.   Musculoskeletal:         General: No swelling or deformity.   Lymphadenopathy:      Cervical: No cervical adenopathy.   Skin:     General: Skin is warm and dry.      Findings: No bruising or rash.   Neurological:      Motor: No weakness or tremor.   Psychiatric:         Mood and Affect: Mood normal.         Behavior: Behavior " normal.         Lab Results   Component Value Date    CREATININE 0.99 05/10/2024    K 4.4 05/10/2024     Lab Results   Component Value Date    TSH 1.89 05/10/2024    TSH 4.16 08/08/2023    ALT 13 05/10/2024     Results from last 7 days   Lab Units 05/10/24  1054   SODIUM mmol/L 140   POTASSIUM mmol/L 4.4   CHLORIDE mmol/L 101   CO2 mmol/L 29   BUN mg/dL 21   CREATININE mg/dL 0.99   CALCIUM mg/dL 9.7   ALBUMIN g/dL 4.6   BILIRUBIN TOTAL mg/dL 0.5   ALK PHOS U/L 75   ALT U/L 13   AST U/L 18   GLUCOSE mg/dL 100*       LABS:  12/28/2021  Free T4 1.3  Cholesterol 174  HDL 64  TG 80  LDL 93  Albumin 4.5  Globulin 2.7  And alk phos 88  ALT 12  TSH 3.29      4/1/2021  TSH 5.13  BG 90  Creatinine 0.97  Sodium 141  Calcium 9.8  Albumin 4.8  Alk phos 74  ALT 12  Free T4 1.1  Hemoglobin 14.4      DEXA 12/29/2021 ([facility])  Bone densitometry of the PA lumbar spine and left hip was performed using a Hologic bone densitometer. The patient is well positioned and the regions of interest are properly placed.     PA spine L1-L4       BMD:  0.673 g/cm2     T-score:  -3.4,   osteoporosis       Total hip                   BMD:  0.626 g/cm2     T-score:  -2.6,   osteoporosis         Femoral neck          BMD:  0.529 g/cm2     T-score:  -2.9,   osteoporosis       BONE DENSITOMETRY 1/2/24 ([facility])    Clinical Indication: Menopause     Bone densitometry of the  AP lumbar spine, left hip and right hip was performed using a Hologic bone densitometer. The patient is well positioned and the regions of interest are properly placed.     Comparison: 12/29/2021         AP spine L1-L4                 BMD:  0.656 g/cm2    T-score: -3.6                                                                                Left: Total hip                    BMD: 0.679 g/cm2     T-score: -2.2                             Left: Femoral neck           BMD:  0.563 g/cm2     T-score:  -2.6       Right: Total hip                   BMD: 0.688 g/cm2     T-score:  -2.1                               Right: Femoral neck         BMD:  0.568 g/cm2      T-score:  -2.5         There is no statistically significant change in the bone density of the lumbar spine.   There is an increase of 8.5% in the bone density of the left hip.   There is no comparison of bone density for the right hip.     Assessment/Plan  Osteoporosis  - s/p left wrist fracture with difficulty healing  - very low T-score  - consistent with severe osteoporosis  -  work up for secondary causes of osteoporosis was negative in March 2022  - she is a candidate for anabolic bone therapy  - she took Tymlos from 4/14/2022 till October 2023.   - she started treatment with prolia on 11/9/23, she is tolerating it well  - DEXA 1/2/24 showed significant improvement  - continue Prolia  - continue adequate calcium and vitamin D intake  Regular exercise, safely  Plan to repeat DEXA January 2026        Problem List Items Addressed This Visit       Age-related osteoporosis without current pathological fracture - Primary       Patient Instructions   - blood sugar number is  minimally off one time  Continuing eating healthy and your exercise    - thyroid levels are normal    - bone density January 2024,showed very good improvement    - vitamin D level is very good    - same dose supplements    -your exercise is excellent    - prolia today    - plan to continue prolia every 6 months, at least till the next bone density in January 2026.     - prolia in 6 months    - follow up and prolia in one year  Labs in one year before the visit.       I spent 40 minutes on this date of service performing the following activities: obtaining history, performing examination, entering orders, documenting, preparing for visit, obtaining / reviewing records, providing counseling and education, independently reviewing study/studies and coordinating care.    Lorenza Yi MD

## 2024-06-06 ENCOUNTER — TELEPHONE (OUTPATIENT)
Dept: ENDOCRINOLOGY | Facility: CLINIC | Age: 59
End: 2024-06-06
Payer: COMMERCIAL

## 2024-06-06 NOTE — TELEPHONE ENCOUNTER
Prince called because she had her last prolia shot May 14th. She recently found out about the [employer] Patient program to help financially with paying for prolia. She stated she has an ID number and would like a call back in reference to this.

## 2024-06-07 NOTE — TELEPHONE ENCOUNTER
Returned pt call. Submitted [employer] report. Placed call to [employer] representative. Awaiting for summary to be returned. Pt will follow up with gen support with any additional questions for OOP costs. Received Prolia injection 5/14/24.

## 2024-06-07 NOTE — TELEPHONE ENCOUNTER
Spoke with Concetta from [employer]. She confirmed $1500 Prolia asistance is per calendar year. Awaiting [employer] report so be received to confirm any additional out of costs for patient. Called and confirmed information with pt.

## 2024-10-25 ENCOUNTER — TELEPHONE (OUTPATIENT)
Dept: ENDOCRINOLOGY | Facility: CLINIC | Age: 59
End: 2024-10-25

## 2024-10-25 DIAGNOSIS — M81.0 AGE-RELATED OSTEOPOROSIS WITHOUT CURRENT PATHOLOGICAL FRACTURE: Primary | ICD-10-CM

## 2024-10-25 NOTE — TELEPHONE ENCOUNTER
Pt needs new PA for Prolia. Would you like her to continue in the office or infusion center? Thanks in advance

## 2024-10-29 NOTE — TELEPHONE ENCOUNTER
Prolia PA has been approved for continuation of therapy. Authorization is valid from 10/29/204-10/28/2025 under case number: [id number]. Pt may receive injection through Infusion center. Please submit orders.

## 2024-10-29 NOTE — TELEPHONE ENCOUNTER
After speaking with BILL Orlando from [employer] at [phone], PA form must be submitted for Prolia. Awaiting appropriate form to be received.

## 2024-10-29 NOTE — TELEPHONE ENCOUNTER
Pt needs 6 month FU appointment to continue therapy in infusion center. Please advise. She is due anytime after 11/15/2024.

## 2024-10-29 NOTE — TELEPHONE ENCOUNTER
Prolia Pre certification request forms with clinical notes were completed and faxed to [phone].awaiting approval. Provider updated.

## 2024-10-30 RX ORDER — FAMOTIDINE 10 MG/ML
20 INJECTION INTRAVENOUS ONCE AS NEEDED
Status: CANCELLED | OUTPATIENT
Start: 2024-11-06

## 2024-10-30 RX ORDER — EPINEPHRINE 1 MG/ML
0.5 INJECTION, SOLUTION INTRAMUSCULAR; SUBCUTANEOUS ONCE AS NEEDED
Status: CANCELLED | OUTPATIENT
Start: 2024-11-06

## 2024-10-30 NOTE — TELEPHONE ENCOUNTER
Pt is out of town until 11/7/24.  is working on schedule to have pt seen before 11/18/2024. Please submit 6W orders. Thanks.

## 2024-11-08 ENCOUNTER — TELEMEDICINE (OUTPATIENT)
Dept: ENDOCRINOLOGY | Facility: CLINIC | Age: 59
End: 2024-11-08
Payer: COMMERCIAL

## 2024-11-08 DIAGNOSIS — M81.0 AGE-RELATED OSTEOPOROSIS WITHOUT CURRENT PATHOLOGICAL FRACTURE: Primary | ICD-10-CM

## 2024-11-08 PROCEDURE — 99215 OFFICE O/P EST HI 40 MIN: CPT | Mod: 95 | Performed by: INTERNAL MEDICINE

## 2024-11-08 RX ORDER — ACETAMINOPHEN 500 MG
2000 TABLET ORAL DAILY
COMMUNITY

## 2024-11-08 NOTE — PROGRESS NOTES
Endocrinology Follow up Note     TELEMEDICINE VISIT    Verification of Patient Location:   The patient affirms they are currently located in the following state: [place]   Request for Consent:   Audio and Video Encounter   Alejandrina, my name is Lorenza Yi MD.  Before we proceed, can you please verify your identification by telling me your full name and date of birth?  Can you tell me who is in the room with you?    You and I are about to have a telemedicine check-in or visit because you have requested it.  This is a live video-conference.  I am a real person, speaking to you in real time.  There is no one else with me on the video-conference. I am not recording this conversation and I am asking you not to record it.  This telemedicine visit will be billed to your health insurance or you, if you are self-insured.  You understand you will be responsible for any copayments or coinsurances that apply to your telemedicine visit.  Communication platform used for this encounter:  [employer] Video Visit (Epic Video Client)       Before starting our telemedicine visit, I am required to get your consent for this virtual check-in or visit by telemedicine. Do you consent?   Patient Response to Request for Consent:   Yes   Time Spent:   I spent 25 minutes on this date of service performing the following activities: obtaining history, entering orders, documenting, preparing for visit, obtaining / reviewing records, providing counseling and education, independently reviewing study/studies, and communicating results.      Patient Name: Jacqueline Reid  MR#: [id number]  : 1965  Consultant: Lorenza Yi MD      Jacqueline Reid is a 59 y.o. female who presents for evaluation for osteoporosis    HPI:  Last visit: 2024  She had work up for secondary causes of osteoporosis after her visit in 2022  She started treatment with Tymlos on 2022.  She finished Tymlos 2023  First dose of prolia:  23  DEXA 24 showed improvement    She had left wrist fracture on 10/30/21, she fell sideways (was holding on to a tree, but had to let go) onto her left side. She had surgery on 2021.   Her ortho recommended a bone density. The radius is healed. 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  It is now healing well. She is doing PT twice a week.   Left wrist fracture as a child (age 7), left collar bone fracture (around age 5)  Left toe fracture.       OSTEOPOROSIS HISTORY:  Fractures: see above  Kidney stones: none  Chronic steroids: none  Seizure medications: none  Acid reflux: none  History of radiation: none  Dental problems: none  Cigarette smoking: never  Alcohol: once a week or less, 1 serving  Caffeine: one cup of coffee per day  Pregnancies: 2 (  and )  LMP age 51  No HRT  No parental history of hip fracture      Supplements:  - calcium plus D 600 mg, one pill daily with food  - vitamin D3 2000 IU daily, started 2022  - no multivitamin    Diet  Greek yogurt 3 to 4 times a week  Cheese most days    Exercise  elliptical/bike and weight machines along light hand weights. She goes 4 to 5 times a week  Water exercise classes.   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      Medical History:  Past Medical History:   Diagnosis Date    Hyperlipidemia     Hypertension     Osteoporosis        Surgical History:   Past Surgical History   Procedure Laterality Date     section      Wrist surgery  2021       Family History:  Family History   Problem Relation Name Age of Onset    Diabetes Biological Mother      Lung cancer Biological Mother      Hypertension Biological Mother      Hyperlipidemia Biological Mother      Kidney disease Biological Mother      Prostate cancer Biological Father      Hypertension Biological Father      Hyperlipidemia Biological Father       Stroke Biological Father      Diabetes Biological Sister      Hypertension Biological Sister      Hyperlipidemia Biological Sister      Hypertension Biological Brother      Hyperlipidemia Biological Brother      Heart disease Biological Brother      Heart attack Biological Brother          Social history:  Social History     Socioeconomic History    Marital status:      Spouse name: Not on file    Number of children: Not on file    Years of education: Not on file    Highest education level: Not on file   Occupational History    Not on file   Tobacco Use    Smoking status: Never    Smokeless tobacco: Never   Substance and Sexual Activity    Alcohol use: Yes    Drug use: Not on file    Sexual activity: Defer   Other Topics Concern    Not on file   Social History Narrative    Not on file     Social Drivers of Health     Financial Resource Strain: Not on file   Food Insecurity: Not on file   Transportation Needs: Not on file   Physical Activity: Not on file   Stress: Not on file   Social Connections: Not on file   Intimate Partner Violence: Not on file   Housing Stability: Not on file       Medication(active prior to today):  Current Outpatient Medications   Medication Sig Dispense Refill    calcium carbonate-vitamin D3 500 mg-10 mcg (400 unit) tablet Take 1 tablet by mouth daily.      cetirizine (ZyrTEC) 10 mg tablet Take 10 mg by mouth daily.      cholecalciferol, vitamin D3, (VITAMIN D3) 50 mcg (2,000 unit) capsule Take 2,000 Units by mouth daily.      CRESTOR 10 mg tablet       olmesartan (BENICAR) 20 mg tablet Take 20 mg by mouth daily.      TYMLOS 80 mcg (3,120 mcg/1.56 mL) pen injector injection INJECT 80 MCG UNDER THE SKIN DAILY. DISCARD 30 DAYS AFTER INITIAL USE 4.68 mL 1     No current facility-administered medications for this visit.       Allergies:  Banana, Kiwi, Quinolones, and Sulfa (sulfonamide antibiotics)    Review of Systems  All other systems reviewed and negative except as noted in  HPI    There were no vitals filed for this visit.    There is no height or weight on file to calculate BMI.    Physical Exam    Lab Results   Component Value Date    CREATININE 0.99 05/10/2024    K 4.4 05/10/2024     Lab Results   Component Value Date    TSH 1.89 05/10/2024    TSH 4.16 08/08/2023    ALT 13 05/10/2024             LABS:  12/28/2021  Free T4 1.3  Cholesterol 174  HDL 64  TG 80  LDL 93  Albumin 4.5  Globulin 2.7  And alk phos 88  ALT 12  TSH 3.29      4/1/2021  TSH 5.13  BG 90  Creatinine 0.97  Sodium 141  Calcium 9.8  Albumin 4.8  Alk phos 74  ALT 12  Free T4 1.1  Hemoglobin 14.4      DEX[facility] 12/29/2021 ([facility])  Bone densitometry of the PA lumbar spine and left hip was performed using a Hologic bone densitometer. The patient is well positioned and the regions of interest are properly placed.     PA spine L1-L4       BMD:  0.673 g/cm2     T-score:  -3.4,   osteoporosis       Total hip                   BMD:  0.626 g/cm2     T-score:  -2.6,   osteoporosis         Femoral neck          BMD:  0.529 g/cm2     T-score:  -2.9,   osteoporosis       BONE DENSITOMETRY 1/2/24 ([facility])    Clinical Indication: Menopause     Bone densitometry of the  AP lumbar spine, left hip and right hip was performed using a Hologic bone densitometer. The patient is well positioned and the regions of interest are properly placed.     Comparison: 12/29/2021         AP spine L1-L4                 BMD:  0.656 g/cm2    T-score: -3.6                                                                                Left: Total hip                    BMD: 0.679 g/cm2     T-score: -2.2                             Left: Femoral neck           BMD:  0.563 g/cm2     T-score:  -2.6       Right: Total hip                  BMD: 0.688 g/cm2     T-score:  -2.1                               Right: Femoral neck         BMD:  0.568 g/cm2      T-score:  -2.5         There is no statistically significant change  in the bone density of the lumbar spine.   There is an increase of 8.5% in the bone density of the left hip.   There is no comparison of bone density for the right hip.     Assessment/Plan   Osteoporosis  - s/p left wrist fracture with difficulty healing  - very low T-score  - consistent with severe osteoporosis  -  work up for secondary causes of osteoporosis was negative in March 2022  - she is a candidate for anabolic bone therapy  - she took Tymlos from 4/14/2022 till October 2023.   - she started treatment with prolia on 11/9/23, she is tolerating it well  - DEXA 1/2/24 showed significant improvement  - continue Prolia  - continue adequate calcium and vitamin D intake  Regular exercise, safely  Plan to repeat DEXA January 2026        Problem List Items Addressed This Visit       Age-related osteoporosis without current pathological fracture - Primary         Patient Instructions   - continue same supplements  calcium plus D 600 mg, one pill daily with food  vitamin D3 2000 IU daily, started January 2022    - continue the exericise    - next prolia later this month at the infusion center  Get your labs done before the infusion    - continue prolia at least till the next bone density    - keep follow up appointment with me in May          I spent 40 minutes on this date of service performing the following activities: obtaining history, performing examination, entering orders, documenting, preparing for visit, obtaining / reviewing records, providing counseling and education, independently reviewing study/studies and coordinating care.    Lorenza Yi MD

## 2024-11-08 NOTE — PATIENT INSTRUCTIONS
- continue same supplements  calcium plus D 600 mg, one pill daily with food  vitamin D3 2000 IU daily, started January 2022    - continue the exericise    - next prolia later this month at the infusion center  Get your labs done before the infusion    - continue prolia at least till the next bone density    - keep follow up appointment with me in May

## 2024-11-14 LAB
25(OH)D3+25(OH)D2 SERPL-MCNC: 50 NG/ML (ref 30–100)
ALBUMIN SERPL-MCNC: 4.5 G/DL (ref 3.6–5.1)
ALBUMIN/GLOB SERPL: 1.6 (CALC) (ref 1–2.5)
ALP SERPL-CCNC: 73 U/L (ref 37–153)
ALT SERPL-CCNC: 15 U/L (ref 6–29)
AST SERPL-CCNC: 16 U/L (ref 10–35)
BILIRUB SERPL-MCNC: 0.5 MG/DL (ref 0.2–1.2)
BUN SERPL-MCNC: 20 MG/DL (ref 7–25)
BUN/CREAT SERPL: NORMAL (CALC) (ref 6–22)
CALCIUM SERPL-MCNC: 9.4 MG/DL (ref 8.6–10.4)
CHLORIDE SERPL-SCNC: 103 MMOL/L (ref 98–110)
CO2 SERPL-SCNC: 30 MMOL/L (ref 20–32)
CREAT SERPL-MCNC: 1.02 MG/DL (ref 0.5–1.03)
EGFRCR SERPLBLD CKD-EPI 2021: 63 ML/MIN/1.73M2
GLOBULIN SER CALC-MCNC: 2.9 G/DL (CALC) (ref 1.9–3.7)
GLUCOSE SERPL-MCNC: 96 MG/DL (ref 65–99)
PHOSPHATE SERPL-MCNC: 3.5 MG/DL (ref 2.5–4.5)
POTASSIUM SERPL-SCNC: 4.7 MMOL/L (ref 3.5–5.3)
PROT SERPL-MCNC: 7.4 G/DL (ref 6.1–8.1)
PTH-INTACT SERPL-MCNC: 88 PG/ML (ref 16–77)
SODIUM SERPL-SCNC: 140 MMOL/L (ref 135–146)

## 2024-11-18 ENCOUNTER — HOSPITAL ENCOUNTER (OUTPATIENT)
Dept: INPATIENT UNIT | Facility: HOSPITAL | Age: 59
Discharge: HOME | End: 2024-11-18
Attending: INTERNAL MEDICINE
Payer: COMMERCIAL

## 2024-11-18 VITALS
SYSTOLIC BLOOD PRESSURE: 146 MMHG | RESPIRATION RATE: 16 BRPM | HEART RATE: 79 BPM | DIASTOLIC BLOOD PRESSURE: 83 MMHG | TEMPERATURE: 98.3 F

## 2024-11-18 DIAGNOSIS — M81.0 AGE-RELATED OSTEOPOROSIS WITHOUT CURRENT PATHOLOGICAL FRACTURE: Primary | ICD-10-CM

## 2024-11-18 PROCEDURE — 96372 THER/PROPH/DIAG INJ SC/IM: CPT

## 2024-11-18 PROCEDURE — 63600000 HC DRUGS/DETAIL CODE: Mod: JZ,TB | Performed by: INTERNAL MEDICINE

## 2024-11-18 PROCEDURE — 3E01305 INTRODUCTION OF OTHER ANTINEOPLASTIC INTO SUBCUTANEOUS TISSUE, PERCUTANEOUS APPROACH: ICD-10-PCS | Performed by: INTERNAL MEDICINE

## 2024-11-18 RX ORDER — DIPHENHYDRAMINE HCL 50 MG/ML
25 VIAL (ML) INJECTION ONCE AS NEEDED
OUTPATIENT
Start: 2025-05-17

## 2024-11-18 RX ORDER — FAMOTIDINE 10 MG/ML
20 INJECTION INTRAVENOUS ONCE AS NEEDED
OUTPATIENT
Start: 2025-05-17

## 2024-11-18 RX ORDER — EPINEPHRINE 1 MG/ML
0.5 INJECTION, SOLUTION INTRAMUSCULAR; SUBCUTANEOUS ONCE AS NEEDED
OUTPATIENT
Start: 2025-05-17

## 2024-11-18 RX ADMIN — DENOSUMAB 60 MG: 60 INJECTION SUBCUTANEOUS at 11:25

## 2024-11-18 ASSESSMENT — PAIN SCALES - GENERAL: PAINLEVEL_OUTOF10: 0-NO PAIN

## 2024-11-18 NOTE — PROGRESS NOTES
Pt here for injection.  Pt received and tolerated Prolia injection to YSABEL without incident.  AVS given.  Pt discharged home.

## 2025-03-25 ENCOUNTER — TELEPHONE (OUTPATIENT)
Dept: ENDOCRINOLOGY | Facility: CLINIC | Age: 60
End: 2025-03-25

## 2025-03-25 NOTE — TELEPHONE ENCOUNTER
Spoke with patient regarding her billing issue from her 6W injection.  I will reconnect with the patient when I hear back from Finance.

## 2025-05-06 DIAGNOSIS — M81.0 AGE-RELATED OSTEOPOROSIS WITHOUT CURRENT PATHOLOGICAL FRACTURE: ICD-10-CM

## 2025-05-06 DIAGNOSIS — E55.9 VITAMIN D DEFICIENCY: Primary | ICD-10-CM

## 2025-05-09 ENCOUNTER — TELEPHONE (OUTPATIENT)
Dept: ENDOCRINOLOGY | Facility: CLINIC | Age: 60
End: 2025-05-09
Payer: COMMERCIAL

## 2025-05-21 LAB
25(OH)D3+25(OH)D2 SERPL-MCNC: 54 NG/ML (ref 30–100)
ALBUMIN SERPL-MCNC: 4.7 G/DL (ref 3.6–5.1)
ALBUMIN/GLOB SERPL: 1.7 (CALC) (ref 1–2.5)
ALP SERPL-CCNC: 69 U/L (ref 37–153)
ALT SERPL-CCNC: 15 U/L (ref 6–29)
AST SERPL-CCNC: 18 U/L (ref 10–35)
BILIRUB SERPL-MCNC: 0.4 MG/DL (ref 0.2–1.2)
BUN SERPL-MCNC: 21 MG/DL (ref 7–25)
BUN/CREAT SERPL: ABNORMAL (CALC) (ref 6–22)
CALCIUM SERPL-MCNC: 9.5 MG/DL (ref 8.6–10.4)
CHLORIDE SERPL-SCNC: 103 MMOL/L (ref 98–110)
CO2 SERPL-SCNC: 32 MMOL/L (ref 20–32)
CREAT SERPL-MCNC: 0.97 MG/DL (ref 0.5–1.05)
EGFRCR SERPLBLD CKD-EPI 2021: 67 ML/MIN/1.73M2
GLOBULIN SER CALC-MCNC: 2.7 G/DL (CALC) (ref 1.9–3.7)
GLUCOSE SERPL-MCNC: 102 MG/DL (ref 65–99)
PHOSPHATE SERPL-MCNC: 3.4 MG/DL (ref 2.5–4.5)
POTASSIUM SERPL-SCNC: 4.4 MMOL/L (ref 3.5–5.3)
PROT SERPL-MCNC: 7.4 G/DL (ref 6.1–8.1)
PTH-INTACT SERPL-MCNC: 68 PG/ML (ref 16–77)
SODIUM SERPL-SCNC: 140 MMOL/L (ref 135–146)
TSH SERPL-ACNC: 3.04 MIU/L (ref 0.4–4.5)

## 2025-05-27 ENCOUNTER — OFFICE VISIT (OUTPATIENT)
Dept: ENDOCRINOLOGY | Facility: CLINIC | Age: 60
End: 2025-05-27
Payer: COMMERCIAL

## 2025-05-27 ENCOUNTER — CLINICAL SUPPORT (OUTPATIENT)
Dept: ENDOCRINOLOGY | Facility: CLINIC | Age: 60
End: 2025-05-27
Payer: COMMERCIAL

## 2025-05-27 VITALS
BODY MASS INDEX: 26.13 KG/M2 | RESPIRATION RATE: 16 BRPM | DIASTOLIC BLOOD PRESSURE: 80 MMHG | WEIGHT: 142 LBS | HEART RATE: 66 BPM | HEIGHT: 62 IN | SYSTOLIC BLOOD PRESSURE: 110 MMHG

## 2025-05-27 DIAGNOSIS — M81.0 AGE-RELATED OSTEOPOROSIS WITHOUT CURRENT PATHOLOGICAL FRACTURE: Primary | ICD-10-CM

## 2025-05-27 DIAGNOSIS — R73.09 ABNORMAL GLUCOSE: ICD-10-CM

## 2025-05-27 PROCEDURE — 99215 OFFICE O/P EST HI 40 MIN: CPT | Performed by: INTERNAL MEDICINE

## 2025-05-27 PROCEDURE — 3008F BODY MASS INDEX DOCD: CPT | Performed by: INTERNAL MEDICINE
